# Patient Record
Sex: FEMALE | Race: WHITE | URBAN - METROPOLITAN AREA
[De-identification: names, ages, dates, MRNs, and addresses within clinical notes are randomized per-mention and may not be internally consistent; named-entity substitution may affect disease eponyms.]

---

## 2017-01-23 ENCOUNTER — HOSPITAL ENCOUNTER (OUTPATIENT)
Dept: LAB | Facility: CLINIC | Age: 32
Discharge: HOME | End: 2017-01-23
Attending: PHYSICAL MEDICINE & REHABILITATION | Admitting: PHYSICAL MEDICINE & REHABILITATION
Payer: COMMERCIAL

## 2017-01-23 ENCOUNTER — OFFICE VISIT (OUTPATIENT)
Dept: ORTHOPEDICS | Facility: CLINIC | Age: 32
End: 2017-01-23
Payer: COMMERCIAL

## 2017-01-23 ENCOUNTER — RESULTS ONLY (OUTPATIENT)
Dept: OTHER | Facility: CLINIC | Age: 32
End: 2017-01-23

## 2017-01-23 VITALS
DIASTOLIC BLOOD PRESSURE: 68 MMHG | BODY MASS INDEX: 32.51 KG/M2 | SYSTOLIC BLOOD PRESSURE: 110 MMHG | WEIGHT: 240 LBS | HEIGHT: 72 IN

## 2017-01-23 DIAGNOSIS — M46.1 SACROILIITIS (H): ICD-10-CM

## 2017-01-23 DIAGNOSIS — M25.551 POSTERIOR PAIN OF RIGHT HIP: ICD-10-CM

## 2017-01-23 DIAGNOSIS — M46.1 SACROILIITIS (H): Primary | ICD-10-CM

## 2017-01-23 LAB
CRP SERPL-MCNC: 12.6 MG/L (ref 0–8)
ERYTHROCYTE [SEDIMENTATION RATE] IN BLOOD BY WESTERGREN METHOD: 29 MM/H (ref 0–20)

## 2017-01-23 PROCEDURE — 85652 RBC SED RATE AUTOMATED: CPT | Performed by: PHYSICAL MEDICINE & REHABILITATION

## 2017-01-23 PROCEDURE — 86038 ANTINUCLEAR ANTIBODIES: CPT | Performed by: PHYSICAL MEDICINE & REHABILITATION

## 2017-01-23 PROCEDURE — 36415 COLL VENOUS BLD VENIPUNCTURE: CPT | Performed by: PHYSICAL MEDICINE & REHABILITATION

## 2017-01-23 PROCEDURE — 81374 HLA I TYPING 1 ANTIGEN LR: CPT | Performed by: PHYSICAL MEDICINE & REHABILITATION

## 2017-01-23 PROCEDURE — 86140 C-REACTIVE PROTEIN: CPT | Performed by: PHYSICAL MEDICINE & REHABILITATION

## 2017-01-23 PROCEDURE — 99204 OFFICE O/P NEW MOD 45 MIN: CPT | Performed by: PHYSICAL MEDICINE & REHABILITATION

## 2017-01-23 PROCEDURE — 86431 RHEUMATOID FACTOR QUANT: CPT | Performed by: PHYSICAL MEDICINE & REHABILITATION

## 2017-01-23 NOTE — MR AVS SNAPSHOT
After Visit Summary   1/23/2017    Merlene Ford    MRN: [id number]           Patient Information     Date Of Birth          1985        Visit Information        Provider Department      1/23/2017 8:40 AM Rico Abrams DO [facility]        Today's Diagnoses     Sacroiliitis (H)    -  1       Care Instructions    We addressed the following today:    1. Right hip/low back pain  2. Right sacroilitis    Activity modification as discussed    Home exercise program as instructed    Topical Treatments: Ice/heat    Over the counter medication: Acetaminophen (Tylenol) 1000 mg every 6 hours with food (Maximum of 3000 mg/day)    Referral to Cornelius Kerns DO - [facility] -  for consideration of a right SI joint platelet-rich plasma injection with ultrasound guidance: Call [phone] to schedule    Rheumatological laboratory testing for further evaluation of current medical state    Call OB/GYN for further discussion of oral pain medication as discussed.    Call ~1-2 business days after completion of laboratory testing for discussion of results/further medical care.    Follow-up as needed for further evaluation/medical care (call direct clinic number [[phone]] at any time with questions or concerns)          Follow-ups after your visit        Who to contact     If you have questions or need follow up information about today's clinic visit or your schedule please contact [facility] directly at [phone].  Normal or non-critical lab and imaging results will be communicated to you by MyChart, letter or phone within 4 business days after the clinic has received the results. If you do not hear from us within 7 days, please contact the clinic through MyChart or phone. If you have a critical or abnormal lab result, we will notify you by phone as soon as possible.  Submit refill requests through [employer] or call your pharmacy and they will forward the refill  "request to us. Please allow 3 business days for your refill to be completed.          Additional Information About Your Visit        [employer] Information     Myandb lets you send messages to your doctor, view your test results, renew your prescriptions, schedule appointments and more. To sign up, go to [email or web address] . Click on \"Log in\" on the left side of the screen, which will take you to the Welcome page. Then click on \"Sign up Now\" on the right side of the page.     You will be asked to enter the access code listed below, as well as some personal information. Please follow the directions to create your username and password.     Your access code is: [email or web address]  Expires: 2017  9:29 AM     Your access code will  in 90 days. If you need help or a new code, please call your [facility] clinic or [phone].        Care EveryWhere ID     This is your Care EveryWhere ID. This could be used by other organizations to access your [facility] medical records  [id number]        Your Vitals Were     Height BMI (Body Mass Index)                1.829 m (6') 32.54 kg/m2           Blood Pressure from Last 3 Encounters:   17 110/68    Weight from Last 3 Encounters:   17 108.863 kg (240 lb)              Today, you had the following     No orders found for display       Primary Care Provider    Physician No Ref-Primary       No address on file        Thank you!     Thank you for choosing [facility]  for your care. Our goal is always to provide you with excellent care. Hearing back from our patients is one way we can continue to improve our services. Please take a few minutes to complete the written survey that you may receive in the mail after your visit with us. Thank you!             Your Updated Medication List - Protect others around you: Learn how to safely use, store and throw away your medicines at www.disposemymeds.org.          This list is accurate as of: 17  9:29 " AM.  Always use your most recent med list.                   Brand Name Dispense Instructions for use    PRENATAL VITAMIN PO          TYLENOL PM EXTRA STRENGTH PO          TYLENOL PO

## 2017-01-23 NOTE — PROGRESS NOTES
[facility]   Clinic Visit s Jan 23, 2017    Subjective:  Merlene Ford is a 31 year old female who is 29 weeks pregnant who is seen as self referral for evaluation of right low back/posterior hip pain with radiation. Patient is accompanied in clinic today by her spouse.    Symptoms began 9-10 months ago.  Reports insidious onset without acute precipitating event.  Reports gradual onset of sharp right hip pain that is located posterior with radiation to the right posterior thigh stopping at the right knee.  Pain is 10/10 in maximal severity and 1/10 currently.  Symptoms are generally worse with laying down and at night and better with rest.  Other treatment has consisted of formal physical therapy ([facility]), SI joint belt, chiropractic care, right hip corticosteroid injections (x 2), and right sacroiliac joint corticosteroid injection with ultrasound guidance with minimal relief.  Denies any bowel/bladder dysfunction or saddle anesthesia.  Denies any numbness/tingling/weakness of the right lower extremity.  Denies any previous low back/right hip injuries/surgeries.     Patient's past medical, surgical, social, and family histories are reviewed today.  Significant medical history as noted above   No past medical history on file.    Review of Systems:  Constitutional: NEGATIVE for fever, chills, or change in weight  Skin: NEGATIVE for worrisome rashes, moles, or lesions  Neuro: NEGATIVE for weakness of the right lower extremity  MSK: see HPI  Additional 10 point ROS is negative other than symptoms noted above and in HPI    Objective:  /68 mmHg  Ht 1.829 m (6')  Wt 108.863 kg (240 lb)  BMI 32.54 kg/m2  General: healthy, alert, and in mild distress  Skin: no suspicious lesions or rashes  Psych: mentation appears normal and affect normal/bright  HEENT: no scleral icterus  CV: no pedal edema  Resp: normal respiratory effort without conversational dyspnea   Neuro:  decreased sensation to light touch of the right lateral foot region.  Motor strength as noted below  Lymph: no palpable lymphadenopathy    MSK:    THORACIC/LUMBAR SPINE  Inspection:    No redness, swelling, overlying skin change, or gross deformity/asymmetry  Palpation:    Tender about the right SI joint    No tenderness over the lumbar spinous processes, lumbar facet joints, paralumbar musculature, SI joint (left), or sciatic notches (bilateral)  Range of Motion:     Lumbar flexion: no pain    Lumbar extension: with pain  Strength:    Quadriceps: 5/5    Hamstrings: 5/5    Dorsiflexion: 5/5    Plantarflexion: 5/5    Great toe extension: 5/5   Special Tests:    Positive: Straight leg raise (right - posterior hip) and Gaenslen's test (right)    Negative: CATRACHO (right)    Imaging:  No x-rays indicated during today's visit  Outside images from [facility] were reviewed today, independent visualization of images was performed, and results were discussed with the patient/spouse  MRI of the Right Hip without Contrast - 10/31/2016  Impression:  1. Mild-moderate bilateral sacroiliitis versus degenerative change.  2. No evidence of trochanteric bursitis.    ASSESSMENT:  1. Right sacroiliitis  2. Right posterior hip pain    PLAN:  1. Acetaminophen/ice/heat as needed for improved pain control.  Instructed to call OB/GYN provider to discuss additional oral medications that could be used including consideration of Flexeril for improvement of pain/discomfort.  2. Activity modification as discussed, including limitation of activities that cause pain/discomfort.  3. Continue with pain-free home exercise program as previously prescribed from formal physical therapy.  4. Rheumatological laboratory testing ordered including HLA-B27, RF, ESR, CRP, AGUSTIN panel, etc. for further evaluation of current medical state.  5. Referral to Dr. Cornelius Kerns at [facility] for consideration of a right sacroiliac joint platelet rich plasma  injection with ultrasound guidance for improvement of pain/discomfort.  6. Call ~1-2 business days after completion of laboratory testing for discussion of results/further medical care.  7. Follow-up as needed for further evaluation/medical care.  Instructed to follow-up if change of symptoms arise.  Consider referral to return to formal physical therapy, referral to orthopedic surgery, etc for further treatment purposes moving forward.  Instructed to contact our office should the condition evolve or worsen, or should any new/progressive neurologic symptoms develop.    Patient's conditions were thoroughly discussed during today's visit with greater than 50% of the visit spent counseling the patient/spouse with total time spent face-to-face with the patient/spouse being 20 minutes.    Rico Abrams DO, [facility]  [facility]    Disclaimer: This note consists of symbols derived from keyboarding, dictation and/or voice recognition software. As a result, there may be errors in the script that have gone undetected. Please consider this when interpreting information found in this chart.    This document serves as a record of the services and decisions personally performed and made by Rico Abrams DO. It was created on his behalf by Juan M Bangura, a trained medical scribe. The creation of this document is based on the provider's statements to the medical scribe.  Juan M Bangura January 23, 2017 9:04 AM

## 2017-01-23 NOTE — PATIENT INSTRUCTIONS
We addressed the following today:    1. Right hip/low back pain  2. Right sacroiliitis    Activity modification as discussed    Home exercise program as instructed    Topical Treatments: Ice/heat    Over the counter medication: Acetaminophen (Tylenol) 1000 mg every 6 hours with food (Maximum of 3000 mg/day)    Referral to Cornelius Kerns DO - [facility] -  for consideration of a right sacroiliac joint platelet-rich plasma injection with ultrasound guidance: Call [phone] to schedule    Rheumatological laboratory testing ordered for further evaluation of current medical state    Call OB/GYN for further discussion of oral pain medications as discussed for improvement of pain/discomfort    Call ~1-2 business days after completion of laboratory testing for discussion of results/further medical care    Follow-up as needed for further evaluation/medical care (call direct clinic number [[phone]] at any time with questions or concerns)

## 2017-01-24 ENCOUNTER — TELEPHONE (OUTPATIENT)
Dept: ORTHOPEDICS | Facility: CLINIC | Age: 32
End: 2017-01-24

## 2017-01-24 LAB
ANA SER QL IA: NORMAL
HLA-B27 QL NAA+PROBE: NORMAL
RHEUMATOID FACT SER NEPH-ACNC: <20 IU/ML (ref 0–20)

## 2017-01-24 NOTE — TELEPHONE ENCOUNTER
Pt left VM asking Dr's note and referral be faxed to [facility] as they will not schedule patient prior to receiving information, can be faxed to [phone], Attn: [facility].    Notes were faxed as requested as Ortho  did not assist patient with scheduling as this is an external referral.

## 2017-01-26 ENCOUNTER — TELEPHONE (OUTPATIENT)
Dept: ORTHOPEDICS | Facility: CLINIC | Age: 32
End: 2017-01-26

## 2017-01-26 DIAGNOSIS — M46.1 SACROILIITIS (H): Primary | ICD-10-CM

## 2017-01-26 LAB
B LOCUS: NORMAL
B27TEST METHOD: NORMAL

## 2017-01-26 NOTE — TELEPHONE ENCOUNTER
Please call patient to inform that results of laboratory testing showed an elevated ESR and CRP levels which is indicative of inflammation. The remainder of her laboratory testing was noted to be unremarkable. Please inform patient that in addition to referral previously placed would recommend referral to rheumatology for further evaluation/medical care. Can be seen in our location by rheumatology or can be referred to [facility] or to the [facility] (recommended) for further evaluation/medical care.    Rico Abrams DO, CAQSM  [facility]

## 2017-01-27 NOTE — TELEPHONE ENCOUNTER
Phone call to patient. She would like a referral to Rheumatology that can get her in the soonest. Informed most likely would be our office building location. Will get back with her next week once referral is placed.     Please see pending referral and complete and sign if appropriate.     COSME Gee RN

## 2017-01-30 NOTE — TELEPHONE ENCOUNTER
Referral signed.  Please call patient to inform.    Rico Abrams DO, MONTSEM  [facility]

## 2017-01-30 NOTE — TELEPHONE ENCOUNTER
Left message for patient that referral was placed. Gave contact number to schedule if she does not get a call within 48 hrs. Asked that she call back for any further questions.     COSME eGe RN

## 2017-01-31 ENCOUNTER — OFFICE VISIT (OUTPATIENT)
Dept: RHEUMATOLOGY | Facility: CLINIC | Age: 32
End: 2017-01-31
Payer: COMMERCIAL

## 2017-01-31 VITALS
WEIGHT: 243 LBS | HEART RATE: 102 BPM | SYSTOLIC BLOOD PRESSURE: 113 MMHG | TEMPERATURE: 97 F | DIASTOLIC BLOOD PRESSURE: 77 MMHG | BODY MASS INDEX: 32.95 KG/M2

## 2017-01-31 DIAGNOSIS — M45.9 ANKYLOSING SPONDYLITIS (H): Primary | ICD-10-CM

## 2017-01-31 PROCEDURE — 99203 OFFICE O/P NEW LOW 30 MIN: CPT | Performed by: INTERNAL MEDICINE

## 2017-01-31 RX ORDER — PREDNISONE 10 MG/1
TABLET ORAL
Qty: 70 TABLET | Refills: 1 | Status: SHIPPED | OUTPATIENT
Start: 2017-01-31 | End: 2017-02-24

## 2017-01-31 NOTE — PROGRESS NOTES
Chief Complaint   Patient presents with     Consult     Dr. Abrams ref.    Pt. Is 30 weeks pregnant     States blood tests were abnormal. Pain in back and hip.   MRI [facility] ortho. Inflammation showing    Initial /77 mmHg  Pulse 102  Temp(Src) 97  F (36.1  C)  Wt 110.224 kg (243 lb) Estimated body mass index is 32.95 kg/(m^2) as calculated from the following:    Height as of 1/23/17: 1.829 m (6').    Weight as of this encounter: 110.224 kg (243 lb).      Patient prefers to be contacted via at Home.   Okay to leave detailed message: Yes  Patient uses MyChart: Yes    Angelica SAINZ LPN    22

## 2017-01-31 NOTE — Clinical Note
January 31, 2017      Merlene Ford  [address]  [facility] [address]              Return to  Work Release    Date: 1/31/2017      Name: Merlene Ford                       YOB: 1985        The patient was seen at: [facility]. [facility]      Resume Activity: In 7 days.        _________________________  Almsa Ro MD

## 2017-01-31 NOTE — PROGRESS NOTES
CHIEF COMPLAINT:  Hip pain.      HISTORY:  Merlene Ford is a 31-year-old female who has had a 9-month history of what appears to be right hip pain with further inquiry though she says she admits to having morning stiffness in her back for many years, probably since her late teens or early 20s that she basically just ignored and was normal apart of systems.  At this point she is 29 weeks' pregnant but started having increasing pain that keeps her awake at night in the right hip.  She was seen and evaluated by Orthopedics who treated her with a variety of modalities and more recently by medical Orthopedics.  She apparently at one point was given a Depo-Medrol Dosepak with no real relief, recently given an SI injection also with no real relief.  An MRI of the hip did not reveal any abnormalities in the hips but did reveal mild to moderate bilateral sacroiliitis versus degenerative change with reactive subchondral edema.        The patient does not have any other joint pain and does admit that she is having pain in her right SI region and down into the groin and right leg.      The patient does not have a rash or psoriasis.  She does not have acute or chronic diarrhea or history of inflammatory bowel disease.  She has never had scleritis or iritis.  There is no history of other joint pain, swelling, or stiffness.  She is stiff in the morning for well over an hour.      PAST MEDICAL HISTORY:  Pregnancy.        MEDICATIONS:  Iron sulfate, Tylenol.      DRUG ALLERGIES:  None.      SOCIAL HISTORY:  Not a smoker or drinker.      FAMILY HISTORY:  There is nobody in her family, psoriasis, ankylosing spondylitis, Crohn's disease.      REVIEW OF SYSTEMS:  As noted above.      PHYSICAL EXAMINATION:   VITAL SIGNS:  Blood pressure of recurrent, 113/77, pulse 102, weight 243.   HEENT:  She is normocephalic and atraumatic.  Sclerae are clear and moist.  Oropharynx without lesions.   NECK:  Supple without lymphadenopathy.   LUNGS:   Clear.   HEART:  Regular without murmur.   ABDOMEN:  Nontender with normal bowel sounds.   MUSCULOSKELETAL:  Joint exam there is no synovitis, erythema or tenderness of the wrists, MCPs, or PIPs, nor of the elbows, shoulders, knees, ankles, MTP joints.  Range of motion of the back is somewhat limited, at the lower lumbar spine there is tenderness over the right SI joint, there is pain with external and internal rotation of the hip.   SKIN:  Without lesions.      IMPRESSION:  Ankylosing spondylitis.      RECOMMENDATIONS:   1.  Discussed etiology, pathogenesis, treatment options of ankylosing spondylitis and educational material provided.   2.  I will need to get an MRI reviewed with Radiology to make certain that we are not seeing something else, but does sound like with reactive edema this is an inflammatory sacroiliitis best characterized as ankylosing spondylitis.     3.  Recommend a longer course of prednisone 40 mg daily for a week and then taper by 10 mg weekly.  It is difficult to get her dose to 10 mg or less until she delivers.  She can also discuss this with Gynecology.   4.  For the long-term I would recommend treatment with Humira.  Side effects of Humira were briefly discussed, educational material provided.   5.  Let me know if she is not improved by the weekend and can make a followup with me in approximately 3-4 weeks.         BOBBY DE SOUZA MD             D: 2017 12:42   T: 2017 12:57   MT: [id number]      Name:     CAMPBELL CISNEROS   MRN:      [id number]        Account:      [id number]   :      1985           Visit Date:   2017      Document: [id number]       cc: Rico Can MD

## 2017-01-31 NOTE — PATIENT INSTRUCTIONS
Ankylosing Spondylitis in Adults  Arthritis is a disease that affects joints in your body. Ankylosing spondylitis (AS) is a type of arthritis that attacks the spine. The name comes from the Anguillan language.  Ankylosing  means stiffening of the joints.  Spondyl  refers to the spine, or vertebrae.  What causes Ankylosing spondylitis?  Healthcare providers don t know exactly what causes AS. Genes may play a role. That s because almost all cases of AS happen in people with a gene called HLA-B27. But only a small percentage of people with this gene actually get AS.  Young and old people can develop AS. But it s more common in people ages 17 to 35. Men are more likely than women to have AS. You are also more likely to have it if someone else in your family had it.  Symptoms  Like other types of arthritis, AS causes pain and stiffness. The spine and other nearby joints, such as the hip, become inflamed. In serious cases, the disease may break down the joints. Bones may even fuse together.  Symptoms of AS may come and go. They often include:    Back pain, especially in the morning when waking up from sleep    Body aches, such as in the legs, shoulders, buttocks, or heels    Stiffness in the morning    Stooped posture to ease pain    Problems inhaling deeply if AS affects the joints between the ribs and the spine    Lack of appetite    Fatigue    Fever    Anemia  Some people with AS also have skin rashes and stomach illnesses. They may have eye problems, too. These include pain, redness, and sensitivity to light. Severe cases of the disease may damage organs such as the heart and lungs. Osteoporosis happens in about 50% of AS patients.   Diagnosing Ankylosing spondylitis  To diagnose AS, your healthcare provider will start with a physical exam. He or she will ask about your symptoms and medical history. X-rays of your spine and other joints may show joint damage. MRI testing (magnetic resonance imaging) may be done as  well. Genetic testing can find out if you have the HLA-B27 gene.  Your healthcare provider may also recommend a lab test that checks for inflammation.  An erythrocyte sedimentation rate test measures how quickly red blood cells fall to the bottom of a test tube. If you have inflammation from arthritis, the red blood cells will clump together and fall faster.  Treating Ankylosing spondylitis  AS can t be cured. But treatments can ease pain and stiffness. They can help you live a more active life. Your healthcare provider will choose the best treatment based on your overall health, the severity of your disease, and other factors.  Several types of medicine can reduce pain and inflammation. These medicines include:    Nonsteroidal anti-inflammatory drugs (NSAIDs), such as naproxen or ibuprofen    Corticosteroids, which can be injected into affected joints and areas.     Muscle relaxants    Biologic medicines    Disease-modifying antirheumatic drugs (DMARDs)  Alternative treatments may also be helpful. Speak with your healthcare provider about the potential benefits of acupuncture, massage, yoga, and TENS units (transcutaneous electrical nerve stimulation).  Quitting smoking may help.   Your healthcare provider may also recommend surgery. For instance, you may need to have a joint replaced. Other procedures remove damaged bone or insert rods into the spine.   Exercising regularly can help relieve your symptoms. Be sure to include activities that strengthen the back and increase flexibility and range of motion. Your healthcare provider may also suggest physical therapy. Maintaining proper posture is important, too.    0751-0148 [employer]. [address] [place] [address], [place] [address]. All rights reserved. This information is not intended as a substitute for professional medical care. Always follow your healthcare professional's instructions.        Adalimumab Solution for injection  What is this  medicine?  ADALIMUMAB (a luis daniel AYE mu mab) is used to treat several types of arthritis. It is also used to treat Crohn's disease, ulcerative colitis, plaque psoriasis, and hidradenitis suppurativa.  This medicine may be used for other purposes; ask your health care provider or pharmacist if you have questions.  What should I tell my health care provider before I take this medicine?  They need to know if you have any of these conditions:    diabetes    heart disease    hepatitis B or history of hepatitis B infection    immune system problems    infection or history of infections    multiple sclerosis    recently received or scheduled to receive a vaccine    scheduled to have surgery    tuberculosis, a positive skin test for tuberculosis or have recently been in close contact with someone who has tuberculosis    an unusual reaction to adalimumab, other medicines, mannitol, latex, rubber, foods, dyes, or preservatives    pregnant or trying to get pregnant    breast-feeding  How should I use this medicine?  This medicine is for injection under the skin. You will be taught how to prepare and give this medicine. Use exactly as directed. Take your medicine at regular intervals. Do not take your medicine more often than directed.  A special MedGuide will be given to you by the pharmacist with each prescription and refill. Be sure to read this information carefully each time.  It is important that you put your used needles and syringes in a special sharps container. Do not put them in a trash can. If you do not have a sharps container, call your pharmacist or healthcare provider to get one.  Talk to your pediatrician regarding the use of this medicine in children. While this drug may be prescribed for children as young as 2 years for selected conditions, precautions do apply.  The  of the medicine offers free information to patients and their health care partners. Call [phone] for more  information.  Overdosage: If you think you have taken too much of this medicine contact a poison control center or emergency room at once.  NOTE: This medicine is only for you. Do not share this medicine with others.  What if I miss a dose?  If you miss a dose, take it as soon as you can. If it is almost time for your next dose, take only that dose. Do not take double or extra doses. Give the next dose when your next scheduled dose is due. Call your doctor or health care professional if you are not sure how to handle a missed dose.  What may interact with this medicine?  Do not take this medicine with any of the following medications:    abatacept    anakinra    etanercept    infliximab    live virus vaccines    rilonacept  This medicine may also interact with the following medications:    vaccines  This list may not describe all possible interactions. Give your health care provider a list of all the medicines, herbs, non-prescription drugs, or dietary supplements you use. Also tell them if you smoke, drink alcohol, or use illegal drugs. Some items may interact with your medicine.  What should I watch for while using this medicine?  Visit your doctor or health care professional for regular checks on your progress. Tell your doctor or healthcare professional if your symptoms do not start to get better or if they get worse.  You will be tested for tuberculosis (TB) before you start this medicine. If your doctor prescribes any medicine for TB, you should start taking the TB medicine before starting this medicine. Make sure to finish the full course of TB medicine.  Call your doctor or health care professional if you get a cold or other infection while receiving this medicine. Do not treat yourself. This medicine may decrease your body's ability to fight infection.  Talk to your doctor about your risk of cancer. You may be more at risk for certain types of cancers if you take this medicine.  What side effects may I  notice from receiving this medicine?  Side effects that you should report to your doctor or health care professional as soon as possible:    allergic reactions like skin rash, itching or hives, swelling of the face, lips, or tongue    breathing problems    changes in vision    chest pain    fever, chills, or any other sign of infection    numbness or tingling    red, scaly patches or raised bumps on the skin    swelling of the ankles    swollen lymph nodes in the neck, underarm, or groin areas    unexplained weight loss    unusual bleeding or bruising    unusually weak or tired  Side effects that usually do not require medical attention (report to your doctor or health care professional if they continue or are bothersome):    headache    nausea    redness, itching, swelling, or bruising at site where injected  This list may not describe all possible side effects. Call your doctor for medical advice about side effects. You may report side effects to FDA at 6-406-FDA-5363.  Where should I keep my medicine?  Keep out of the reach of children.  Store in the original container and in the refrigerator between 2 and 8 degrees C (36 and 46 degrees F). Do not freeze. The product may be stored in a cool carrier with an ice pack, if needed. Protect from light. Throw away any unused medicine after the expiration date.  NOTE:This sheet is a summary. It may not cover all possible information. If you have questions about this medicine, talk to your doctor, pharmacist, or health care provider. Copyright  2016 [employer]

## 2017-01-31 NOTE — MR AVS SNAPSHOT
After Visit Summary   1/31/2017    Merlene Ford    MRN: [id number]           Patient Information     Date Of Birth          1985        Visit Information        Provider Department      1/31/2017 9:30 AM Almas Ro MD [facility]        Today's Diagnoses     Ankylosing spondylitis (H)    -  1       Care Instructions      Ankylosing Spondylitis in Adults  Arthritis is a disease that affects joints in your body. Ankylosing spondylitis (AS) is a type of arthritis that attacks the spine. The name comes from the [place] language.  Ankylosing  means stiffening of the joints.  Spondyl  refers to the spine, or vertebrae.  What causes Ankylosing spondylitis?  Healthcare providers don t know exactly what causes AS. Genes may play a role. That s because almost all cases of AS happen in people with a gene called HLA-B27. But only a small percentage of people with this gene actually get AS.  Young and old people can develop AS. But it s more common in people ages 17 to 35. Men are more likely than women to have AS. You are also more likely to have it if someone else in your family had it.  Symptoms  Like other types of arthritis, AS causes pain and stiffness. The spine and other nearby joints, such as the hip, become inflamed. In serious cases, the disease may break down the joints. Bones may even fuse together.  Symptoms of AS may come and go. They often include:    Back pain, especially in the morning when waking up from sleep    Body aches, such as in the legs, shoulders, buttocks, or heels    Stiffness in the morning    Stooped posture to ease pain    Problems inhaling deeply if AS affects the joints between the ribs and the spine    Lack of appetite    Fatigue    Fever    Anemia  Some people with AS also have skin rashes and stomach illnesses. They may have eye problems, too. These include pain, redness, and sensitivity to light. Severe cases of the disease may damage  organs such as the heart and lungs. Osteoporosis happens in about 50% of AS patients.   Diagnosing Ankylosing spondylitis  To diagnose AS, your healthcare provider will start with a physical exam. He or she will ask about your symptoms and medical history. X-rays of your spine and other joints may show joint damage. MRI testing (magnetic resonance imaging) may be done as well. Genetic testing can find out if you have the HLA-B27 gene.  Your healthcare provider may also recommend a lab test that checks for inflammation.  An erythrocyte sedimentation rate test measures how quickly red blood cells fall to the bottom of a test tube. If you have inflammation from arthritis, the red blood cells will clump together and fall faster.  Treating Ankylosing spondylitis  AS can t be cured. But treatments can ease pain and stiffness. They can help you live a more active life. Your healthcare provider will choose the best treatment based on your overall health, the severity of your disease, and other factors.  Several types of medicine can reduce pain and inflammation. These medicines include:    Nonsteroidal anti-inflammatory drugs (NSAIDs), such as naproxen or ibuprofen    Corticosteroids, which can be injected into affected joints and areas.     Muscle relaxants    Biologic medicines    Disease-modifying antirheumatic drugs (DMARDs)  Alternative treatments may also be helpful. Speak with your healthcare provider about the potential benefits of acupuncture, massage, yoga, and TENS units (transcutaneous electrical nerve stimulation).  Quitting smoking may help.   Your healthcare provider may also recommend surgery. For instance, you may need to have a joint replaced. Other procedures remove damaged bone or insert rods into the spine.   Exercising regularly can help relieve your symptoms. Be sure to include activities that strengthen the back and increase flexibility and range of motion. Your healthcare provider may also suggest  physical therapy. Maintaining proper posture is important, too.    7995-0870 [employer]. [address] [place] [address], [place] [address]. All rights reserved. This information is not intended as a substitute for professional medical care. Always follow your healthcare professional's instructions.        Adalimumab Solution for injection  What is this medicine?  ADALIMUMAB (a luis daniel AYE mu mab) is used to treat several types of arthritis. It is also used to treat Crohn's disease, ulcerative colitis, plaque psoriasis, and hidradenitis suppurativa.  This medicine may be used for other purposes; ask your health care provider or pharmacist if you have questions.  What should I tell my health care provider before I take this medicine?  They need to know if you have any of these conditions:    diabetes    heart disease    hepatitis B or history of hepatitis B infection    immune system problems    infection or history of infections    multiple sclerosis    recently received or scheduled to receive a vaccine    scheduled to have surgery    tuberculosis, a positive skin test for tuberculosis or have recently been in close contact with someone who has tuberculosis    an unusual reaction to adalimumab, other medicines, mannitol, latex, rubber, foods, dyes, or preservatives    pregnant or trying to get pregnant    breast-feeding  How should I use this medicine?  This medicine is for injection under the skin. You will be taught how to prepare and give this medicine. Use exactly as directed. Take your medicine at regular intervals. Do not take your medicine more often than directed.  A special MedGuide will be given to you by the pharmacist with each prescription and refill. Be sure to read this information carefully each time.  It is important that you put your used needles and syringes in a special sharps container. Do not put them in a trash can. If you do not have a sharps container, call your pharmacist or healthcare  provider to get one.  Talk to your pediatrician regarding the use of this medicine in children. While this drug may be prescribed for children as young as 2 years for selected conditions, precautions do apply.  The  of the medicine offers free information to patients and their health care partners. Call [phone] for more information.  Overdosage: If you think you have taken too much of this medicine contact a poison control center or emergency room at once.  NOTE: This medicine is only for you. Do not share this medicine with others.  What if I miss a dose?  If you miss a dose, take it as soon as you can. If it is almost time for your next dose, take only that dose. Do not take double or extra doses. Give the next dose when your next scheduled dose is due. Call your doctor or health care professional if you are not sure how to handle a missed dose.  What may interact with this medicine?  Do not take this medicine with any of the following medications:    abatacept    anakinra    etanercept    infliximab    live virus vaccines    rilonacept  This medicine may also interact with the following medications:    vaccines  This list may not describe all possible interactions. Give your health care provider a list of all the medicines, herbs, non-prescription drugs, or dietary supplements you use. Also tell them if you smoke, drink alcohol, or use illegal drugs. Some items may interact with your medicine.  What should I watch for while using this medicine?  Visit your doctor or health care professional for regular checks on your progress. Tell your doctor or healthcare professional if your symptoms do not start to get better or if they get worse.  You will be tested for tuberculosis (TB) before you start this medicine. If your doctor prescribes any medicine for TB, you should start taking the TB medicine before starting this medicine. Make sure to finish the full course of TB medicine.  Call your doctor  or health care professional if you get a cold or other infection while receiving this medicine. Do not treat yourself. This medicine may decrease your body's ability to fight infection.  Talk to your doctor about your risk of cancer. You may be more at risk for certain types of cancers if you take this medicine.  What side effects may I notice from receiving this medicine?  Side effects that you should report to your doctor or health care professional as soon as possible:    allergic reactions like skin rash, itching or hives, swelling of the face, lips, or tongue    breathing problems    changes in vision    chest pain    fever, chills, or any other sign of infection    numbness or tingling    red, scaly patches or raised bumps on the skin    swelling of the ankles    swollen lymph nodes in the neck, underarm, or groin areas    unexplained weight loss    unusual bleeding or bruising    unusually weak or tired  Side effects that usually do not require medical attention (report to your doctor or health care professional if they continue or are bothersome):    headache    nausea    redness, itching, swelling, or bruising at site where injected  This list may not describe all possible side effects. Call your doctor for medical advice about side effects. You may report side effects to FDA at 7-408-FDA-8835.  Where should I keep my medicine?  Keep out of the reach of children.  Store in the original container and in the refrigerator between 2 and 8 degrees C (36 and 46 degrees F). Do not freeze. The product may be stored in a cool carrier with an ice pack, if needed. Protect from light. Throw away any unused medicine after the expiration date.  NOTE:This sheet is a summary. It may not cover all possible information. If you have questions about this medicine, talk to your doctor, pharmacist, or health care provider. Copyright  2016 [employer]              Follow-ups after your visit        Who to contact     If you  have questions or need follow up information about today's clinic visit or your schedule please contact [facility] directly at [phone].  Normal or non-critical lab and imaging results will be communicated to you by MyChart, letter or phone within 4 business days after the clinic has received the results. If you do not hear from us within 7 days, please contact the clinic through [employer] or phone. If you have a critical or abnormal lab result, we will notify you by phone as soon as possible.  Submit refill requests through [employer] or call your pharmacy and they will forward the refill request to us. Please allow 3 business days for your refill to be completed.          Additional Information About Your Visit        [employer] Information     [employer] gives you secure access to your electronic health record. If you see a primary care provider, you can also send messages to your care team and make appointments. If you have questions, please call your primary care clinic.  If you do not have a primary care provider, please call [phone] and they will assist you.        Care EveryWhere ID     This is your Care EveryWhere ID. This could be used by other organizations to access your [facility] medical records  [id number]        Your Vitals Were     Pulse Temperature                102 97  F (36.1  C)           Blood Pressure from Last 3 Encounters:   01/31/17 113/77   01/23/17 110/68    Weight from Last 3 Encounters:   01/31/17 110.224 kg (243 lb)   01/23/17 108.863 kg (240 lb)              Today, you had the following     No orders found for display         Today's Medication Changes          These changes are accurate as of: 1/31/17 10:18 AM.  If you have any questions, ask your nurse or doctor.               Start taking these medicines.        Dose/Directions    predniSONE 10 MG tablet   Commonly known as:  DELTASONE   Used for:  Ankylosing spondylitis (H)   Started by:  Almas Ro  MD Frandy        40 mg for 1 week and taper by 10 mg weekly   Quantity:  70 tablet   Refills:  1            Where to get your medicines      These medications were sent to [facility]/pharmacy #[id number] - [place], [place] - [address]  [address] [facility] [address]     Phone:  [phone]    - predniSONE 10 MG tablet             Primary Care Provider    Physician No Ref-Primary       No address on file        Thank you!     Thank you for choosing [facility]  for your care. Our goal is always to provide you with excellent care. Hearing back from our patients is one way we can continue to improve our services. Please take a few minutes to complete the written survey that you may receive in the mail after your visit with us. Thank you!             Your Updated Medication List - Protect others around you: Learn how to safely use, store and throw away your medicines at www.disposemymeds.org.          This list is accurate as of: 1/31/17 10:18 AM.  Always use your most recent med list.                   Brand Name Dispense Instructions for use    IRON SUPPLEMENT PO      Take 325 mg by mouth       predniSONE 10 MG tablet    DELTASONE    70 tablet    40 mg for 1 week and taper by 10 mg weekly       PRENATAL VITAMIN PO          TYLENOL PM EXTRA STRENGTH PO          TYLENOL PO

## 2017-02-21 ENCOUNTER — OFFICE VISIT (OUTPATIENT)
Dept: RHEUMATOLOGY | Facility: CLINIC | Age: 32
End: 2017-02-21
Payer: COMMERCIAL

## 2017-02-21 VITALS
SYSTOLIC BLOOD PRESSURE: 118 MMHG | BODY MASS INDEX: 33.36 KG/M2 | HEART RATE: 107 BPM | DIASTOLIC BLOOD PRESSURE: 71 MMHG | WEIGHT: 246 LBS

## 2017-02-21 DIAGNOSIS — M45.9 ANKYLOSING SPONDYLITIS (H): Primary | ICD-10-CM

## 2017-02-21 PROCEDURE — 99213 OFFICE O/P EST LOW 20 MIN: CPT | Performed by: INTERNAL MEDICINE

## 2017-02-21 NOTE — MR AVS SNAPSHOT
After Visit Summary   2/21/2017    Merlene Ford    MRN: [id number]           Patient Information     Date Of Birth          1985        Visit Information        Provider Department      2/21/2017 8:30 AM Almas Ro MD [facility]         Follow-ups after your visit        Who to contact     If you have questions or need follow up information about today's clinic visit or your schedule please contact [facility] directly at [phone].  Normal or non-critical lab and imaging results will be communicated to you by MyChart, letter or phone within 4 business days after the clinic has received the results. If you do not hear from us within 7 days, please contact the clinic through Pathways Platformhart or phone. If you have a critical or abnormal lab result, we will notify you by phone as soon as possible.  Submit refill requests through VOZ or call your pharmacy and they will forward the refill request to us. Please allow 3 business days for your refill to be completed.          Additional Information About Your Visit        MyChart Information     VOZ gives you secure access to your electronic health record. If you see a primary care provider, you can also send messages to your care team and make appointments. If you have questions, please call your primary care clinic.  If you do not have a primary care provider, please call [phone] and they will assist you.        Care EveryWhere ID     This is your Care EveryWhere ID. This could be used by other organizations to access your [place] medical records  [id number]        Your Vitals Were     Pulse BMI (Body Mass Index)                107 33.36 kg/m2           Blood Pressure from Last 3 Encounters:   02/21/17 118/71   01/31/17 113/77   01/23/17 110/68    Weight from Last 3 Encounters:   02/21/17 111.6 kg (246 lb)   01/31/17 110.2 kg (243 lb)   01/23/17 108.9 kg (240 lb)              Today, you  had the following     No orders found for display       Primary Care Provider    Physician No Ref-Primary       No address on file        Thank you!     Thank you for choosing [facility]  for your care. Our goal is always to provide you with excellent care. Hearing back from our patients is one way we can continue to improve our services. Please take a few minutes to complete the written survey that you may receive in the mail after your visit with us. Thank you!             Your Updated Medication List - Protect others around you: Learn how to safely use, store and throw away your medicines at www.disposemymeds.org.          This list is accurate as of: 2/21/17 10:17 AM.  Always use your most recent med list.                   Brand Name Dispense Instructions for use    IRON SUPPLEMENT PO      Take 325 mg by mouth Reported on 2/21/2017       predniSONE 10 MG tablet    DELTASONE    70 tablet    40 mg for 1 week and taper by 10 mg weekly       PRENATAL VITAMIN PO          TYLENOL PM EXTRA STRENGTH PO      Reported on 2/21/2017       TYLENOL PO      Reported on 2/21/2017

## 2017-02-21 NOTE — PROGRESS NOTES
HISTORY:  Ms. Merlene Cisneros is seen back in followup for her ankylosing spondylitis.  She is doing quite well on the prednisone, has a little bit of morning stiffness, but overall thinks things are going well.  The pregnancy is going well as well.  She is down to 10 mg a day of prednisone and is wondering further about the taper.  At this point, she has elected not to pursue treatment with a TNF inhibitor while she is pregnant.  We discussed that its utilization in the postpartum period and that it is acceptable to use even if breastfeeding, although there may be a slightly increased risk of infections such as mastitis.  There is no chance that it will affect the baby/infant as it does not go across in breastmilk.      PHYSICAL EXAMINATION:  Blood pressure 118/71, pulse 101, weight 246.  The lower back is stiff, but she does appear to be moving fairly well today.  Today peripheral joint exam was unremarkable.      IMPRESSION:  Ankylosing spondylitis.      RECOMMENDATIONS:     1.  I still do need to review her MRI with Radiology tomorrow.    2.  Stay on the 10 mg of prednisone for 1 week and then try to go to 5 mg a day.  Do not try to come off while she is pregnant.   3.  In the postpartum period, come back to see me discuss going on a TNF inhibitor such as Humira.         BOBBY DE SOUZA MD             D: 2017 12:16   T: 2017 12:44   MT: JOSÉ MIGUEL#[id number]      Name:     MERLENE CISNEROS   MRN:      [id number]        Account:      [id number]   :      1985           Visit Date:   2017      Document: [id number]

## 2017-02-21 NOTE — NURSING NOTE
Chief Complaint   Patient presents with     RECHECK     States she is doing well.    Initial /71 (BP Location: Right arm, Cuff Size: Adult Regular)  Pulse 107  Wt 111.6 kg (246 lb)  BMI 33.36 kg/m2 Estimated body mass index is 33.36 kg/(m^2) as calculated from the following:    Height as of 1/23/17: 1.829 m (6').    Weight as of this encounter: 111.6 kg (246 lb).      Patient prefers to be contacted via at Home.   Okay to leave detailed message: Yes  Patient uses MyChart: Yes    Angelica SAINZ LPN

## 2017-02-22 ENCOUNTER — MYC MEDICAL ADVICE (OUTPATIENT)
Dept: RHEUMATOLOGY | Facility: CLINIC | Age: 32
End: 2017-02-22

## 2017-02-24 ENCOUNTER — MYC MEDICAL ADVICE (OUTPATIENT)
Dept: RHEUMATOLOGY | Facility: CLINIC | Age: 32
End: 2017-02-24

## 2017-02-24 DIAGNOSIS — M45.9 ANKYLOSING SPONDYLITIS (H): ICD-10-CM

## 2017-02-24 RX ORDER — PREDNISONE 10 MG/1
10-15 TABLET ORAL DAILY
Qty: 45 TABLET | Refills: 0 | Status: SHIPPED | OUTPATIENT
Start: 2017-02-24 | End: 2017-02-28

## 2017-02-28 RX ORDER — PREDNISONE 10 MG/1
10-15 TABLET ORAL DAILY
Qty: 45 TABLET | Refills: 2 | Status: SHIPPED | OUTPATIENT
Start: 2017-02-28 | End: 2017-03-29

## 2017-03-01 ENCOUNTER — MYC MEDICAL ADVICE (OUTPATIENT)
Dept: RHEUMATOLOGY | Facility: CLINIC | Age: 32
End: 2017-03-01

## 2017-03-02 NOTE — TELEPHONE ENCOUNTER
Try goinig to 40 mg for 1 week and then working down by 10 mg every week to 20 mg and then 5 mg every week as low as possible and if we can't do that then she will need to go to humira

## 2017-03-21 ENCOUNTER — OFFICE VISIT (OUTPATIENT)
Dept: RHEUMATOLOGY | Facility: CLINIC | Age: 32
End: 2017-03-21
Payer: COMMERCIAL

## 2017-03-21 VITALS — DIASTOLIC BLOOD PRESSURE: 73 MMHG | HEART RATE: 80 BPM | SYSTOLIC BLOOD PRESSURE: 116 MMHG

## 2017-03-21 DIAGNOSIS — M45.9 ANKYLOSING SPONDYLITIS (H): Primary | ICD-10-CM

## 2017-03-21 PROCEDURE — 99213 OFFICE O/P EST LOW 20 MIN: CPT | Performed by: INTERNAL MEDICINE

## 2017-03-21 NOTE — MR AVS SNAPSHOT
After Visit Summary   3/21/2017    Merlene Ford    MRN: [id number]           Patient Information     Date Of Birth          1985        Visit Information        Provider Department      3/21/2017 12:45 PM Almas Ro MD [facility]        Today's Diagnoses     Ankylosing spondylitis (H)    -  1       Follow-ups after your visit        Who to contact     If you have questions or need follow up information about today's clinic visit or your schedule please contact [facility] directly at [phone].  Normal or non-critical lab and imaging results will be communicated to you by Conductivhart, letter or phone within 4 business days after the clinic has received the results. If you do not hear from us within 7 days, please contact the clinic through AVISt or phone. If you have a critical or abnormal lab result, we will notify you by phone as soon as possible.  Submit refill requests through The [email or web address] or call your pharmacy and they will forward the refill request to us. Please allow 3 business days for your refill to be completed.          Additional Information About Your Visit        MyChart Information     The [email or web address] gives you secure access to your electronic health record. If you see a primary care provider, you can also send messages to your care team and make appointments. If you have questions, please call your primary care clinic.  If you do not have a primary care provider, please call [phone] and they will assist you.        Care EveryWhere ID     This is your Care EveryWhere ID. This could be used by other organizations to access your [place] medical records  [id number]        Your Vitals Were     Pulse                   80            Blood Pressure from Last 3 Encounters:   03/21/17 116/73   02/21/17 118/71   01/31/17 113/77    Weight from Last 3 Encounters:   02/21/17 246 lb (111.6 kg)   01/31/17 243 lb (110.2 kg)   01/23/17 240 lb  (108.9 kg)              Today, you had the following     No orders found for display       Primary Care Provider    Physician No Ref-Primary       No address on file        Thank you!     Thank you for choosing [facility]  for your care. Our goal is always to provide you with excellent care. Hearing back from our patients is one way we can continue to improve our services. Please take a few minutes to complete the written survey that you may receive in the mail after your visit with us. Thank you!             Your Updated Medication List - Protect others around you: Learn how to safely use, store and throw away your medicines at www.disposemymeds.org.          This list is accurate as of: 3/21/17 11:59 PM.  Always use your most recent med list.                   Brand Name Dispense Instructions for use    IRON SUPPLEMENT PO      Take 325 mg by mouth Reported on 3/21/2017       predniSONE 10 MG tablet    DELTASONE    45 tablet    Take 1-1.5 tablets (10-15 mg) by mouth daily       PRENATAL VITAMIN PO          TYLENOL PM EXTRA STRENGTH PO      Reported on 3/21/2017       TYLENOL PO      Reported on 3/21/2017       ZANTAC PO

## 2017-03-21 NOTE — PROGRESS NOTES
HISTORY:  Ms. Merlene Cisneros comes in because over the weekend she had been in urgent care or the ER; she had swelling in her right leg.  An ultrasound was unremarkable, so they told her check in with me.  She had been having more pain in her right hip again.  Denied having actual pain or swelling in the knee itself.  She is on 40 mg a day of prednisone, scheduled to go to 30.  She says as long as she is not weightbearing, she does not hurt.  Unfortunately, she is still working and plans to work until 04/01/2017.  She is also not quite ready to start Humira.  She also wondered whether or not it is safe to used in breastfeeding; this should not be an issue as it is a large molecule that will not go across in breastmilk.  This could give an increased risk of infection such as mastitis, although I am not aware of any actual data documenting this one way or another.      PHYSICAL EXAMINATION:  Blood pressure 116/73, pulse 80.  There is peripheral edema present, but one leg is not large than the other.  There is no Baker's cyst present behind either knee.  There is no synovitis, effusion of the knees or ankles.      IMPRESSION:  Ankylosing spondylitis.        RECOMMENDATIONS:    1.  Would strongly consider going on Humira.  She could check with her obstetrician as to whether or not it is safe to use with breastfeeding, but I am not aware of any contraindication.     2.  Increase the prednisone to 60 mg through the weekend and then let me know how she is doing.  If only somewhat better, then taper by 10 mg; if a lot better, go to 40.    3.  At this point she is well beyond the period of teratogenicity and hence there would be no risk to being on Humira, even during this point of late term pregnancy.    4.  She should see me back after delivery.         BOBBY DE SOUZA MD             D: 03/21/2017 15:54   T: 03/21/2017 16:12   MT: EM#[id number]      Name:     MERLENE CISNEROS   MRN:      [id number]        Account:       [id number]   :      1985           Visit Date:   2017      Document: [id number]

## 2017-03-21 NOTE — NURSING NOTE
Chief Complaint   Patient presents with     RECHECK     Was at the ER [place]  . Friday night, swelling right foot.  Rash on both legs.   Initial /73  Pulse 80 Estimated body mass index is 33.36 kg/(m^2) as calculated from the following:    Height as of 1/23/17: 1.829 m (6').    Weight as of 2/21/17: 111.6 kg (246 lb).      Patient prefers to be contacted via at Home.   Okay to leave detailed message: Yes  Patient uses MyChart: Yes    Angelica SAINZ LPN

## 2017-03-27 ENCOUNTER — MYC MEDICAL ADVICE (OUTPATIENT)
Dept: RHEUMATOLOGY | Facility: CLINIC | Age: 32
End: 2017-03-27

## 2017-03-27 DIAGNOSIS — M45.9 ANKYLOSING SPONDYLITIS (H): ICD-10-CM

## 2017-03-29 DIAGNOSIS — M45.9 ANKYLOSING SPONDYLITIS (H): ICD-10-CM

## 2017-03-29 RX ORDER — PREDNISONE 10 MG/1
TABLET ORAL
Qty: 90 TABLET | Refills: 3 | Status: CANCELLED | OUTPATIENT
Start: 2017-03-29

## 2017-03-29 RX ORDER — PREDNISONE 10 MG/1
TABLET ORAL
Qty: 90 TABLET | Refills: 3 | Status: SHIPPED | OUTPATIENT
Start: 2017-03-29 | End: 2018-02-15

## 2017-03-29 NOTE — TELEPHONE ENCOUNTER
predniSONE (DELTASONE) 10 MG tablet    Date Last Filled: 02/24/2017  QTY: 70    [facility]

## 2017-04-20 ENCOUNTER — HOSPITAL ENCOUNTER (OUTPATIENT)
Dept: LAB | Facility: CLINIC | Age: 32
Discharge: HOME | End: 2017-04-20
Attending: INTERNAL MEDICINE | Admitting: INTERNAL MEDICINE
Payer: COMMERCIAL

## 2017-04-20 ENCOUNTER — TELEPHONE (OUTPATIENT)
Dept: ORTHOPEDICS | Facility: CLINIC | Age: 32
End: 2017-04-20

## 2017-04-20 ENCOUNTER — OFFICE VISIT (OUTPATIENT)
Dept: RHEUMATOLOGY | Facility: CLINIC | Age: 32
End: 2017-04-20
Payer: COMMERCIAL

## 2017-04-20 VITALS
DIASTOLIC BLOOD PRESSURE: 72 MMHG | HEART RATE: 88 BPM | SYSTOLIC BLOOD PRESSURE: 113 MMHG | WEIGHT: 241 LBS | BODY MASS INDEX: 32.69 KG/M2

## 2017-04-20 DIAGNOSIS — M45.9 ANKYLOSING SPONDYLITIS (H): Primary | ICD-10-CM

## 2017-04-20 DIAGNOSIS — M45.9 ANKYLOSING SPONDYLITIS (H): ICD-10-CM

## 2017-04-20 PROCEDURE — 99213 OFFICE O/P EST LOW 20 MIN: CPT | Performed by: INTERNAL MEDICINE

## 2017-04-20 PROCEDURE — 86480 TB TEST CELL IMMUN MEASURE: CPT | Performed by: INTERNAL MEDICINE

## 2017-04-20 PROCEDURE — 86803 HEPATITIS C AB TEST: CPT | Performed by: INTERNAL MEDICINE

## 2017-04-20 PROCEDURE — 36415 COLL VENOUS BLD VENIPUNCTURE: CPT | Performed by: INTERNAL MEDICINE

## 2017-04-20 NOTE — PROGRESS NOTES
HISTORY:  Ms. Merlene Cisneros is seen back in followup for her ankylosing spondylitis.  We went over again how the diagnosis was arrived at.  She is now postpartum, she is on prednisone 10 mg daily and has pain in her lower back only the level 2 though.  She is also having pain where she had the epidural placed.  No other joint pain, swelling, or stiffness.  It should be remembered that the MRIs is what documented sacroiliitis.      PHYSICAL EXAMINATION:  Blood pressure is 113/72, pulse 88.  There is diminished range of motion of the lower lumbar spine.  There is no synovitis of the wrists, MCPs, or PIPs, nor the elbows, shoulders, knees or ankles.        IMPRESSION:  Magnetic resonance imaging proven ankylosing spondylitis.      RECOMMENDATIONS:    1.  Treatment algorithm for this is TNF inhibitor.  This is the algorithm proposed and endorsed by the American College of Rheumatology.  We will start on Humira 40 mg subcutaneously every 2 weeks.  Onset of action is 2-6 weeks with peak effect at 3 months.  Side effects of site reactions, infections, malignancy discussed.   2.  We will also need to check a baseline TB QuantiFERON and Hep C.    3.  Follow up after 2 months of therapy to assess response to therapy.         BOBBY DE SOUZA MD             D: 2017 12:45   T: 2017 13:36   MT: JOSÉ MIGUEL#[id number]      Name:     MERLENE CISNEROS   MRN:      [id number]        Account:      [id number]   :      1985           Visit Date:   2017      Document: [id number]

## 2017-04-20 NOTE — LETTER
[facility]  [address]  [address]  [place] [address]  [phone]      April 27, 2017      Merlene Ford  [address]  [place] [facility] [place] [address]    Medical Management  [employer]  Re: Humira/Letter dated 4/20/2017  ID:  [id number]    Dear Sirs,    We would like to request an appeal for Antonette Ford  for the medication Humira. The patient has Ankylosing Spondylitis with  MRIs that documenting sacroiliitis.  The current standard of care for patients with Ankylosing Spondylitis is a TNF-inhibitor.  Oral medications have never been found to be a effective treatment and your requirement goes against treatment guidelines.   Therefore, I believe it is medically appropriate to Ms. King with Humira in an attempt to preserve joint structure and function, and to limit the requirement for future joint replacement surgery.    Thank you for your reconsideration.  We will be waiting for your response.    Almas Ro MD Rheumatologist

## 2017-04-20 NOTE — TELEPHONE ENCOUNTER
[facility] Prior Authorization Team   Phone: [phone]  Fax: [phone]      PA Initiation    Medication: Humira  Insurance Company: [employer] [place] - Phone [phone]  Pharmacy Filling the Rx: [facility] - [place], [place] - [address]  Filling Pharmacy Phone: [phone]  Filling Pharmacy Fax: [phone]  Start Date: 4/20/2017

## 2017-04-20 NOTE — MR AVS SNAPSHOT
After Visit Summary   4/20/2017    Merlene Ford    MRN: [id number]           Patient Information     Date Of Birth          1985        Visit Information        Provider Department      4/20/2017 9:00 AM Almas Ro MD [facility]        Today's Diagnoses     Ankylosing spondylitis (H)    -  1       Follow-ups after your visit        Future tests that were ordered for you today     Open Future Orders        Priority Expected Expires Ordered    M Tuberculosis by Quantiferon Routine  4/20/2018 4/20/2017    Hepatitis C antibody Routine  4/20/2018 4/20/2017            Who to contact     If you have questions or need follow up information about today's clinic visit or your schedule please contact [facility] directly at [phone].  Normal or non-critical lab and imaging results will be communicated to you by MyChart, letter or phone within 4 business days after the clinic has received the results. If you do not hear from us within 7 days, please contact the clinic through MyChart or phone. If you have a critical or abnormal lab result, we will notify you by phone as soon as possible.  Submit refill requests through [employer] or call your pharmacy and they will forward the refill request to us. Please allow 3 business days for your refill to be completed.          Additional Information About Your Visit        MyChart Information     [employer] gives you secure access to your electronic health record. If you see a primary care provider, you can also send messages to your care team and make appointments. If you have questions, please call your primary care clinic.  If you do not have a primary care provider, please call [phone] and they will assist you.        Care EveryWhere ID     This is your Care EveryWhere ID. This could be used by other organizations to access your [place] medical records  [id number]        Your Vitals Were     Pulse BMI  (Body Mass Index)                88 32.69 kg/m2           Blood Pressure from Last 3 Encounters:   04/20/17 113/72   03/21/17 116/73   02/21/17 118/71    Weight from Last 3 Encounters:   04/20/17 109.3 kg (241 lb)   02/21/17 111.6 kg (246 lb)   01/31/17 110.2 kg (243 lb)                 Today's Medication Changes          These changes are accurate as of: 4/20/17 10:08 AM.  If you have any questions, ask your nurse or doctor.               Start taking these medicines.        Dose/Directions    adalimumab 40 MG/0.8ML pen kit   Commonly known as:  HUMIRA   Used for:  Ankylosing spondylitis (H)   Started by:  Almas Ro MD        Dose:  40 mg   Inject 0.8 mLs (40 mg) Subcutaneous every 14 days   Quantity:  2 each   Refills:  3            Where to get your medicines      These medications were sent to [facility] - [place] [address] [employer] [facility]  [address] [place] [address]    Hours:  Mon-Fri 8:30am-5:00pm Toll Free [phone] Phone:  [phone]     adalimumab 40 MG/0.8ML pen kit                Primary Care Provider    Physician No Ref-Primary       No address on file        Thank you!     Thank you for choosing [facility]  for your care. Our goal is always to provide you with excellent care. Hearing back from our patients is one way we can continue to improve our services. Please take a few minutes to complete the written survey that you may receive in the mail after your visit with us. Thank you!             Your Updated Medication List - Protect others around you: Learn how to safely use, store and throw away your medicines at www.disposemymeds.org.          This list is accurate as of: 4/20/17 10:08 AM.  Always use your most recent med list.                   Brand Name Dispense Instructions for use    adalimumab 40 MG/0.8ML pen kit    HUMIRA    2 each    Inject 0.8 mLs (40 mg) Subcutaneous every 14 days       COLACE PO       Take by mouth 2 times daily       predniSONE 10 MG tablet    DELTASONE    90 tablet    40 mg daily, taper by 10 mg every 1-2 weeks as able       PRENATAL VITAMIN PO

## 2017-04-21 LAB — HCV AB SERPL QL IA: NORMAL

## 2017-04-21 NOTE — TELEPHONE ENCOUNTER
PRIOR AUTHORIZATION DENIED    Medication: Humira - DENIED    Denial Date: 4/20/2017    Denial Rational: Patient has not tried/failed or have a contraindication to conventional, oral treatments. The services are not consistent with professionally recognized standards of medical practice; and its effectiveness in improving health outcomes for the condition for which it is recommended or prescribed is not known.     Appeal Information:   Phone: [phone]  Fax: [phone]  [facility]  Appeals Department  [address]  [place] [address]

## 2017-04-24 LAB
M TB TUBERC IFN-G BLD QL: NEGATIVE
M TB TUBERC IFN-G/MITOGEN IGNF BLD: 0.01 IU/ML

## 2017-05-04 ENCOUNTER — MYC MEDICAL ADVICE (OUTPATIENT)
Dept: RHEUMATOLOGY | Facility: CLINIC | Age: 32
End: 2017-05-04

## 2017-05-30 ENCOUNTER — MYC MEDICAL ADVICE (OUTPATIENT)
Dept: RHEUMATOLOGY | Facility: CLINIC | Age: 32
End: 2017-05-30

## 2017-06-20 ENCOUNTER — MYC MEDICAL ADVICE (OUTPATIENT)
Dept: RHEUMATOLOGY | Facility: CLINIC | Age: 32
End: 2017-06-20

## 2017-06-20 NOTE — TELEPHONE ENCOUNTER
Fax sent to [phone] for Short Term Disability   Copies sent to kareen SAINZ LPN  [facility]  Rheumatology-Dr. Ro  [address]   [address] [place] [address]

## 2017-07-18 ENCOUNTER — OFFICE VISIT (OUTPATIENT)
Dept: RHEUMATOLOGY | Facility: CLINIC | Age: 32
End: 2017-07-18
Payer: COMMERCIAL

## 2017-07-18 VITALS
WEIGHT: 249 LBS | DIASTOLIC BLOOD PRESSURE: 65 MMHG | SYSTOLIC BLOOD PRESSURE: 111 MMHG | HEART RATE: 82 BPM | BODY MASS INDEX: 33.77 KG/M2

## 2017-07-18 DIAGNOSIS — M45.0 ANKYLOSING SPONDYLITIS OF MULTIPLE SITES IN SPINE (H): Primary | ICD-10-CM

## 2017-07-18 PROCEDURE — 99213 OFFICE O/P EST LOW 20 MIN: CPT | Performed by: INTERNAL MEDICINE

## 2017-07-18 NOTE — NURSING NOTE
Chief Complaint   Patient presents with     RECHECK       Initial /65  Pulse 82  Wt 112.9 kg (249 lb)  BMI 33.77 kg/m2 Estimated body mass index is 33.77 kg/(m^2) as calculated from the following:    Height as of 1/23/17: 1.829 m (6').    Weight as of this encounter: 112.9 kg (249 lb).      Patient prefers to be contacted via at Home.   Okay to leave detailed message: Yes  Patient uses MyChart: Yes    Angelica SAINZ LPN

## 2017-07-18 NOTE — MR AVS SNAPSHOT
After Visit Summary   7/18/2017    Merlene Ford    MRN: [id number]           Patient Information     Date Of Birth          1985        Visit Information        Provider Department      7/18/2017 1:00 PM Almas Ro MD [facility]         Follow-ups after your visit        Who to contact     If you have questions or need follow up information about today's clinic visit or your schedule please contact [facility] directly at [phone].  Normal or non-critical lab and imaging results will be communicated to you by MyChart, letter or phone within 4 business days after the clinic has received the results. If you do not hear from us within 7 days, please contact the clinic through [employer] or phone. If you have a critical or abnormal lab result, we will notify you by phone as soon as possible.  Submit refill requests through RepRegen or call your pharmacy and they will forward the refill request to us. Please allow 3 business days for your refill to be completed.          Additional Information About Your Visit        MyChart Information     RepRegen gives you secure access to your electronic health record. If you see a primary care provider, you can also send messages to your care team and make appointments. If you have questions, please call your primary care clinic.  If you do not have a primary care provider, please call [phone] and they will assist you.        Care EveryWhere ID     This is your Care EveryWhere ID. This could be used by other organizations to access your [place] medical records  [id number]        Your Vitals Were     Pulse BMI (Body Mass Index)                82 33.77 kg/m2           Blood Pressure from Last 3 Encounters:   07/18/17 111/65   04/20/17 113/72   03/21/17 116/73    Weight from Last 3 Encounters:   07/18/17 112.9 kg (249 lb)   04/20/17 109.3 kg (241 lb)   02/21/17 111.6 kg (246 lb)              Today, you  had the following     No orders found for display       Primary Care Provider    Physician No Ref-Primary       No address on file        Equal Access to Services     DEIRDRE ARROYOKAVIN : Hadii aad cristal maranda Adams, elda robemichelle, rosa babin francyfloresita, yoselin naziain hayaajan senwinnie stearnsfaith alexander. So [facility] [phone].    ATENCIÓN: Si habla español, tiene a muñoz disposición servicios gratuitos de asistencia lingüística. Llame al [phone].    We comply with applicable federal civil rights laws and [place] laws. We do not discriminate on the basis of race, color, national origin, age, disability sex, sexual orientation or gender identity.            Thank you!     Thank you for choosing [facility]  for your care. Our goal is always to provide you with excellent care. Hearing back from our patients is one way we can continue to improve our services. Please take a few minutes to complete the written survey that you may receive in the mail after your visit with us. Thank you!             Your Updated Medication List - Protect others around you: Learn how to safely use, store and throw away your medicines at www.disposemymeds.org.          This list is accurate as of: 7/18/17  1:25 PM.  Always use your most recent med list.                   Brand Name Dispense Instructions for use Diagnosis    adalimumab 40 MG/0.8ML pen kit    HUMIRA    2 each    Inject 0.8 mLs (40 mg) Subcutaneous every 14 days    Ankylosing spondylitis (H)       COLACE PO      Take by mouth 2 times daily        predniSONE 10 MG tablet    DELTASONE    90 tablet    40 mg daily, taper by 10 mg every 1-2 weeks as able    Ankylosing spondylitis (H)       PRENATAL VITAMIN PO           sertraline 50 MG tablet    ZOLOFT     Take 100 mg by mouth

## 2017-07-23 NOTE — PROGRESS NOTES
History:  Ms. Ford is seen back in followup for her ankylosing spondylitis.  She has only had 2 injections of Humira and has not noticed any dramatic change in her symptoms.  She is not on prednisone.  She needs me to fill out Work Ability forms for her disability.  She tells me because of the ongoing back pain, which is her major problem, and knee pain, she cannot do any prolonged standing or kneeling or squatting.  This is mostly what is involved in her job.  She is also having some shoulder discomfort which would make it difficult for her to reach for things above her head.  There is also the problem with fatigue related to this as well.  At this point, she does not feel she needs to be back on prednisone.    PHYSICAL EXAM:  Blood pressure 111/65, pulse 82.  There is clear stiffness in her lower lumbar spine region.  There is no synovitis of the knees and ankles.    IMPRESSION:  Ankylosing spondylitis.     Recommendations:    1.  Onset of action of Humira is still 4-6 weeks away.  Hopefully she will notice some improvement at that time.    2.  At this point, she is still going to be disabled because of her job requirements, which is basically standing all day with no allowance for sitting or change of position.  She also cannot do any kneeling, squatting, or reaching for things above her head.  3.  Follow up in 4-6 weeks to make certain that there has been improvement from Humira.        Almas Ro MD    D:  07/18/2017 16:04 T:  07/21/2017 22:13  Document:  [id number] \.

## 2017-07-28 ENCOUNTER — MYC MEDICAL ADVICE (OUTPATIENT)
Dept: RHEUMATOLOGY | Facility: CLINIC | Age: 32
End: 2017-07-28

## 2017-07-28 DIAGNOSIS — M45.9 ANKYLOSING SPONDYLITIS (H): ICD-10-CM

## 2017-07-28 NOTE — TELEPHONE ENCOUNTER
Replacement rx sent.  Forwarding to provider to notify pt update on humira response.  Carmencita Blankenship RN  [facility]

## 2017-08-02 NOTE — TELEPHONE ENCOUNTER
MEDICATION APPEAL APPROVED    Medication: Humira - APPEAL APPROVED  Authorization Effective Date:  04/20/2017  Authorization Expiration Date:  04/20/2018  Approved Dose/Quantity: N/A  Reference #: [id number]   Insurance Company: [employer] [place] - Phone [phone]  Expected CoPay:       CoPay Card Available:      Foundation Assistance Needed:    Which Pharmacy is filling the prescription (Not needed for infusion/clinic administered): [facility] - [place], [place] - [facility] [address]

## 2017-08-29 ENCOUNTER — OFFICE VISIT (OUTPATIENT)
Dept: RHEUMATOLOGY | Facility: CLINIC | Age: 32
End: 2017-08-29
Payer: COMMERCIAL

## 2017-08-29 VITALS
BODY MASS INDEX: 33.09 KG/M2 | SYSTOLIC BLOOD PRESSURE: 122 MMHG | TEMPERATURE: 98.8 F | HEART RATE: 84 BPM | WEIGHT: 244 LBS | DIASTOLIC BLOOD PRESSURE: 83 MMHG

## 2017-08-29 DIAGNOSIS — M45.0 ANKYLOSING SPONDYLITIS OF MULTIPLE SITES IN SPINE (H): Primary | ICD-10-CM

## 2017-08-29 PROCEDURE — 99213 OFFICE O/P EST LOW 20 MIN: CPT | Performed by: INTERNAL MEDICINE

## 2017-08-29 NOTE — NURSING NOTE
Chief Complaint   Patient presents with     RECHECK       Initial /83  Pulse 84  Temp 98.8  F (37.1  C)  Wt 110.7 kg (244 lb)  BMI 33.09 kg/m2 Estimated body mass index is 33.09 kg/(m^2) as calculated from the following:    Height as of 1/23/17: 1.829 m (6').    Weight as of this encounter: 110.7 kg (244 lb).      Patient prefers to be contacted via at Home.   Okay to leave detailed message: Yes  Patient uses MyChart: Yes    Angelica SAINZ LPN

## 2017-08-30 ENCOUNTER — MYC MEDICAL ADVICE (OUTPATIENT)
Dept: RHEUMATOLOGY | Facility: CLINIC | Age: 32
End: 2017-08-30

## 2017-08-30 NOTE — PROGRESS NOTES
REASON FOR VISIT:  Ms. Merlene Cisneros is seen back in follow-up for ankylosing spondylitis.      SUBJECTIVE:  In the last two weeks, she has suddenly gotten dramatically better, nothing is hurting.  The only thing she notes is a little bit of stiffness when she is trying to bend over, specifically when she tries to bend over and wash her baby in the tub, or pick him back up out of the tub, but overall things are dramatically improved, and this just occurred recently.  She is ready to go back to full duty at work; however, she was not able to bring her form in because her printer was not working.      She denies having any site reactions or infections from the Humira.      PHYSICAL EXAM:   VITAL SIGNS:  Blood pressure 122/83, pulse 84.   MUSCULOSKELETAL:  Range of motion of the lumbar spine is relatively well preserved.  Joint exam is otherwise unremarkable.   SKIN:  Without lesion.      IMPRESSION:  Ankylosing spondylitis.      RECOMMENDATIONS:   1.  She will bring in the paperwork for me to fill out to return to normal work duty.   2.  Continue Humira 40 mg subcutaneously every 2 weeks.   3.  Follow up with me in approximately 3 months.         BOBBY DE SOUZA MD             D: 2017 13:10   T: 2017 05:27   MT: JOSÉ MIGUEL#[id number]      Name:     MERLENE CISNEROS   MRN:      [id number]        Account:      [id number]   :      1985           Visit Date:   2017      Document: [id number]

## 2017-08-31 ENCOUNTER — TELEPHONE (OUTPATIENT)
Dept: RHEUMATOLOGY | Facility: CLINIC | Age: 32
End: 2017-08-31

## 2017-09-11 ENCOUNTER — OFFICE VISIT (OUTPATIENT)
Dept: FAMILY MEDICINE | Facility: CLINIC | Age: 32
End: 2017-09-11
Payer: COMMERCIAL

## 2017-09-11 VITALS
TEMPERATURE: 97.8 F | SYSTOLIC BLOOD PRESSURE: 104 MMHG | BODY MASS INDEX: 32.55 KG/M2 | WEIGHT: 240 LBS | OXYGEN SATURATION: 98 % | DIASTOLIC BLOOD PRESSURE: 70 MMHG | RESPIRATION RATE: 16 BRPM | HEART RATE: 68 BPM

## 2017-09-11 DIAGNOSIS — F32.1 MODERATE MAJOR DEPRESSION (H): Primary | ICD-10-CM

## 2017-09-11 PROCEDURE — 99213 OFFICE O/P EST LOW 20 MIN: CPT | Performed by: FAMILY MEDICINE

## 2017-09-11 RX ORDER — ACETAMINOPHEN AND CODEINE PHOSPHATE 120; 12 MG/5ML; MG/5ML
1 SOLUTION ORAL DAILY
Qty: 28 TABLET | Refills: 0 | COMMUNITY
Start: 2017-09-11

## 2017-09-11 RX ORDER — ESCITALOPRAM OXALATE 10 MG/1
10 TABLET ORAL DAILY
Qty: 30 TABLET | Refills: 1 | Status: SHIPPED | OUTPATIENT
Start: 2017-09-11 | End: 2017-12-12

## 2017-09-11 ASSESSMENT — ANXIETY QUESTIONNAIRES
1. FEELING NERVOUS, ANXIOUS, OR ON EDGE: NEARLY EVERY DAY
3. WORRYING TOO MUCH ABOUT DIFFERENT THINGS: NEARLY EVERY DAY
IF YOU CHECKED OFF ANY PROBLEMS ON THIS QUESTIONNAIRE, HOW DIFFICULT HAVE THESE PROBLEMS MADE IT FOR YOU TO DO YOUR WORK, TAKE CARE OF THINGS AT HOME, OR GET ALONG WITH OTHER PEOPLE: VERY DIFFICULT
6. BECOMING EASILY ANNOYED OR IRRITABLE: NEARLY EVERY DAY
GAD7 TOTAL SCORE: 13
2. NOT BEING ABLE TO STOP OR CONTROL WORRYING: NEARLY EVERY DAY
7. FEELING AFRAID AS IF SOMETHING AWFUL MIGHT HAPPEN: SEVERAL DAYS
5. BEING SO RESTLESS THAT IT IS HARD TO SIT STILL: NOT AT ALL

## 2017-09-11 ASSESSMENT — PATIENT HEALTH QUESTIONNAIRE - PHQ9
SUM OF ALL RESPONSES TO PHQ QUESTIONS 1-9: 18
5. POOR APPETITE OR OVEREATING: NOT AT ALL

## 2017-09-11 NOTE — MR AVS SNAPSHOT
After Visit Summary   9/11/2017    Merlene Ford    MRN: [id number]           Patient Information     Date Of Birth          1985        Visit Information        Provider Department      9/11/2017 5:00 PM Lluvia Baird MD [facility]        Today's Diagnoses     Moderate major depression (H)    -  1       Follow-ups after your visit        Additional Services     MENTAL HEALTH REFERRAL       Your provider has referred you to: [facility] Providers Intake Scheduling [phone]  [email or web address]  WOULD LIKE TO GET HER SCHEDULED WITH PSYCHIATRY     All scheduling is subject to the client's specific insurance plan & benefits, provider/location availability, and provider clinical specialities.  Please arrive 15 minutes early for your first appointment and bring your completed paperwork.    Please be aware that coverage of these services is subject to the terms and limitations of your health insurance plan.  Call member services at your health plan with any benefit or coverage questions.                  Who to contact     If you have questions or need follow up information about today's clinic visit or your schedule please contact [facility] directly at [phone].  Normal or non-critical lab and imaging results will be communicated to you by EcoSwarmhart, letter or phone within 4 business days after the clinic has received the results. If you do not hear from us within 7 days, please contact the clinic through EcoSwarmhart or phone. If you have a critical or abnormal lab result, we will notify you by phone as soon as possible.  Submit refill requests through Itineris or call your pharmacy and they will forward the refill request to us. Please allow 3 business days for your refill to be completed.          Additional Information About Your Visit        MyChart Information     Itineris gives you secure access to your electronic health record. If you see a  primary care provider, you can also send messages to your care team and make appointments. If you have questions, please call your primary care clinic.  If you do not have a primary care provider, please call [phone] and they will assist you.        Care EveryWhere ID     This is your Care EveryWhere ID. This could be used by other organizations to access your [place] medical records  [id number]        Your Vitals Were     Pulse Temperature Respirations Pulse Oximetry BMI (Body Mass Index)       68 97.8  F (36.6  C) (Oral) 16 98% 32.55 kg/m2        Blood Pressure from Last 3 Encounters:   09/11/17 104/70   08/29/17 122/83   07/18/17 111/65    Weight from Last 3 Encounters:   09/11/17 240 lb (108.9 kg)   08/29/17 244 lb (110.7 kg)   07/18/17 249 lb (112.9 kg)              We Performed the Following     MENTAL HEALTH REFERRAL          Today's Medication Changes          These changes are accurate as of: 9/11/17  5:34 PM.  If you have any questions, ask your nurse or doctor.               Start taking these medicines.        Dose/Directions    escitalopram 10 MG tablet   Commonly known as:  LEXAPRO   Used for:  Moderate major depression (H)   Started by:  Lluvia Baird MD        Dose:  10 mg   Take 1 tablet (10 mg) by mouth daily   Quantity:  30 tablet   Refills:  1         Stop taking these medicines if you haven't already. Please contact your care team if you have questions.     sertraline 50 MG tablet   Commonly known as:  ZOLOFT   Stopped by:  Lluvia Baird MD                Where to get your medicines      These medications were sent to [facility]/pharmacy #[id number] [facility] [address]  [address] [address] [address]     Phone:  [phone]     escitalopram 10 MG tablet                Primary Care Provider    None       No address on file        Equal Access to Services     ARVIND CONTRERAS AH: Kim Adams, padmaja martinez, yoselin kearney  chris neely ah. So [facility] [phone].    ATENCIÓN: Si nella wynn, tiene a muñoz disposición servicios gratuitos de asistencia lingüística. Checo rhoades [phone].    We comply with applicable federal civil rights laws and [place] laws. We do not discriminate on the basis of race, color, national origin, age, disability sex, sexual orientation or gender identity.            Thank you!     Thank you for choosing [facility]  for your care. Our goal is always to provide you with excellent care. Hearing back from our patients is one way we can continue to improve our services. Please take a few minutes to complete the written survey that you may receive in the mail after your visit with us. Thank you!             Your Updated Medication List - Protect others around you: Learn how to safely use, store and throw away your medicines at www.disposemymeds.org.          This list is accurate as of: 9/11/17  5:34 PM.  Always use your most recent med list.                   Brand Name Dispense Instructions for use Diagnosis    adalimumab 40 MG/0.8ML pen kit    HUMIRA    1 each    Inject 0.8 mLs (40 mg) Subcutaneous every 14 days ONE REPLACEMENT PEN FOR DEFECT    Ankylosing spondylitis of multiple sites in spine (H)       escitalopram 10 MG tablet    LEXAPRO    30 tablet    Take 1 tablet (10 mg) by mouth daily    Moderate major depression (H)       norethindrone 0.35 MG per tablet    MICRONOR    28 tablet    Take 1 tablet (0.35 mg) by mouth daily        predniSONE 10 MG tablet    DELTASONE    90 tablet    40 mg daily, taper by 10 mg every 1-2 weeks as able    Ankylosing spondylitis (H)

## 2017-09-11 NOTE — PROGRESS NOTES
SUBJECTIVE:   Merlene Ford is a 32 year old female who presents to clinic today for the following health issues:    She got depressed toward the end of her pregnancy.  She has 5 month old baby.  Her OB started her on zoloft.   It worked at first at 50 mg.   She then had to have it increased to 100 mg and then to 150 mg.   It seems not to be working.   She is sad all the time and cries for now reason.   She has very bad dreams.   She would like to see psychiatrist.         Depression and Anxiety Follow-Up    Status since last visit: No change    Other associated symptoms:night broderick     Complicating factors:     Significant life event: Yes-  Had baby      Current substance abuse: None    No flowsheet data found.  No flowsheet data found.    PHQ-9  English  PHQ-9   Any Language  GAD7      Amount of exercise or physical activity: 4-5 days/week for an average of 45-60 minutes    Problems taking medications regularly: No    Medication side effects: none  Diet: regular (no restrictions)    History reviewed. No pertinent past medical history.    History reviewed. No pertinent surgical history.    MEDICATIONS:  Current Outpatient Prescriptions   Medication     adalimumab (HUMIRA) 40 MG/0.8ML pen kit     sertraline (ZOLOFT) 50 MG tablet     Docusate Sodium (COLACE PO)     predniSONE (DELTASONE) 10 MG tablet     Prenatal Vit-Fe Fumarate-FA (PRENATAL VITAMIN PO)     No current facility-administered medications for this visit.        SOCIAL HISTORY:  Social History   Substance Use Topics     Smoking status: Never Smoker     Smokeless tobacco: Never Used     Alcohol use Not on file       History reviewed. No pertinent family history.    Objective:  Blood pressure 104/70, pulse 68, temperature 97.8  F (36.6  C), temperature source Oral, resp. rate 16, weight 240 lb (108.9 kg), SpO2 98 %.  Neck:  There is no lymphadenopathy or thyroid tenderness or enlargement  Chest: Clear to auscultation bilaterally.  No wheezes, rales or  retractions.  CV: Regular rate and rhythm without murmurs, rubs or gallops.  Extremities: There is no clubbing, cyanosis or edema.  Peripheral pulses are present bilaterally in the radial and dorsalis pedis arteries.  Affect:  Slightly flat  Mood: good eye contact appropriate  The patient denies suidal thought    PHQ-9: 18      Assessment:  1. Moderate depression        Plan:  1. Will stop zoloft  2. Start lexapro  3. Refer to mental health  4. The potential side effects of the prescription medication were discussed with the patient.  5. Recheck in 4-6 weeks  6. Patient declined therapy

## 2017-09-11 NOTE — NURSING NOTE
Chief Complaint   Patient presents with     Referral     for mental health       Initial /70 (BP Location: Right arm, Patient Position: Chair, Cuff Size: Adult Large)  Pulse 68  Temp 97.8  F (36.6  C) (Oral)  Resp 16  Wt 240 lb (108.9 kg)  SpO2 98%  BMI 32.55 kg/m2 Estimated body mass index is 32.55 kg/(m^2) as calculated from the following:    Height as of 1/23/17: 6' (1.829 m).    Weight as of this encounter: 240 lb (108.9 kg).  Medication Reconciliation: complete

## 2017-09-12 ASSESSMENT — ANXIETY QUESTIONNAIRES: GAD7 TOTAL SCORE: 13

## 2017-10-10 ENCOUNTER — MYC MEDICAL ADVICE (OUTPATIENT)
Dept: RHEUMATOLOGY | Facility: CLINIC | Age: 32
End: 2017-10-10

## 2017-10-10 DIAGNOSIS — M45.0 ANKYLOSING SPONDYLITIS OF MULTIPLE SITES IN SPINE (H): ICD-10-CM

## 2017-12-12 ENCOUNTER — HOSPITAL ENCOUNTER (OUTPATIENT)
Dept: LAB | Facility: CLINIC | Age: 32
Discharge: HOME | End: 2017-12-12
Attending: INTERNAL MEDICINE | Admitting: INTERNAL MEDICINE
Payer: COMMERCIAL

## 2017-12-12 ENCOUNTER — OFFICE VISIT (OUTPATIENT)
Dept: RHEUMATOLOGY | Facility: CLINIC | Age: 32
End: 2017-12-12
Payer: COMMERCIAL

## 2017-12-12 VITALS
DIASTOLIC BLOOD PRESSURE: 77 MMHG | WEIGHT: 230 LBS | BODY MASS INDEX: 31.19 KG/M2 | HEART RATE: 72 BPM | SYSTOLIC BLOOD PRESSURE: 122 MMHG

## 2017-12-12 DIAGNOSIS — M45.0 ANKYLOSING SPONDYLITIS OF MULTIPLE SITES IN SPINE (H): ICD-10-CM

## 2017-12-12 LAB
AST SERPL W P-5'-P-CCNC: 14 U/L (ref 0–45)
BASOPHILS # BLD AUTO: 0 10E9/L (ref 0–0.2)
BASOPHILS NFR BLD AUTO: 0.3 %
DIFFERENTIAL METHOD BLD: NORMAL
EOSINOPHIL # BLD AUTO: 0.1 10E9/L (ref 0–0.7)
EOSINOPHIL NFR BLD AUTO: 2.1 %
ERYTHROCYTE [DISTWIDTH] IN BLOOD BY AUTOMATED COUNT: 12.5 % (ref 10–15)
HCT VFR BLD AUTO: 39.8 % (ref 35–47)
HGB BLD-MCNC: 13 G/DL (ref 11.7–15.7)
IMM GRANULOCYTES # BLD: 0 10E9/L (ref 0–0.4)
IMM GRANULOCYTES NFR BLD: 0.2 %
LYMPHOCYTES # BLD AUTO: 2 10E9/L (ref 0.8–5.3)
LYMPHOCYTES NFR BLD AUTO: 32.6 %
MCH RBC QN AUTO: 27.2 PG (ref 26.5–33)
MCHC RBC AUTO-ENTMCNC: 32.7 G/DL (ref 31.5–36.5)
MCV RBC AUTO: 83 FL (ref 78–100)
MONOCYTES # BLD AUTO: 0.6 10E9/L (ref 0–1.3)
MONOCYTES NFR BLD AUTO: 9.3 %
NEUTROPHILS # BLD AUTO: 3.4 10E9/L (ref 1.6–8.3)
NEUTROPHILS NFR BLD AUTO: 55.5 %
NRBC # BLD AUTO: 0 10*3/UL
NRBC BLD AUTO-RTO: 0 /100
PLATELET # BLD AUTO: 239 10E9/L (ref 150–450)
RBC # BLD AUTO: 4.78 10E12/L (ref 3.8–5.2)
WBC # BLD AUTO: 6.1 10E9/L (ref 4–11)

## 2017-12-12 PROCEDURE — 85025 COMPLETE CBC W/AUTO DIFF WBC: CPT | Performed by: INTERNAL MEDICINE

## 2017-12-12 PROCEDURE — 84450 TRANSFERASE (AST) (SGOT): CPT | Performed by: INTERNAL MEDICINE

## 2017-12-12 PROCEDURE — 36415 COLL VENOUS BLD VENIPUNCTURE: CPT | Performed by: INTERNAL MEDICINE

## 2017-12-12 PROCEDURE — 99213 OFFICE O/P EST LOW 20 MIN: CPT | Performed by: INTERNAL MEDICINE

## 2017-12-12 RX ORDER — SERTRALINE HYDROCHLORIDE 100 MG/1
100 TABLET, FILM COATED ORAL
COMMUNITY
Start: 2017-07-26

## 2017-12-12 RX ORDER — GABAPENTIN 300 MG/1
CAPSULE ORAL
Refills: 1 | COMMUNITY
Start: 2017-09-13 | End: 2018-02-15

## 2017-12-12 NOTE — NURSING NOTE
Chief Complaint   Patient presents with     RECHECK       Initial /77  Pulse 72  Wt 104.3 kg (230 lb)  BMI 31.19 kg/m2 Estimated body mass index is 31.19 kg/(m^2) as calculated from the following:    Height as of 1/23/17: 1.829 m (6').    Weight as of this encounter: 104.3 kg (230 lb).      Patient prefers to be contacted via at Home.   Okay to leave detailed message: Yes  Patient uses MyChart: Yes    Angelica DRUMMOND LPN

## 2017-12-12 NOTE — MR AVS SNAPSHOT
After Visit Summary   12/12/2017    Merlene Ford    MRN: [id number]           Patient Information     Date Of Birth          1985        Visit Information        Provider Department      12/12/2017 9:15 AM Almas Ro MD [facility]        Today's Diagnoses     Ankylosing spondylitis of multiple sites in spine (H)           Follow-ups after your visit        Follow-up notes from your care team     Return in about 6 months (around 6/12/2018).      Future tests that were ordered for you today     Open Future Orders        Priority Expected Expires Ordered    CBC with platelets differential Routine  12/12/2018 12/12/2017    AST Routine  12/12/2018 12/12/2017            Who to contact     If you have questions or need follow up information about today's clinic visit or your schedule please contact [facility] directly at [phone].  Normal or non-critical lab and imaging results will be communicated to you by MyChart, letter or phone within 4 business days after the clinic has received the results. If you do not hear from us within 7 days, please contact the clinic through [email or web address] or phone. If you have a critical or abnormal lab result, we will notify you by phone as soon as possible.  Submit refill requests through TicketsNow or call your pharmacy and they will forward the refill request to us. Please allow 3 business days for your refill to be completed.          Additional Information About Your Visit        MyChart Information     TicketsNow gives you secure access to your electronic health record. If you see a primary care provider, you can also send messages to your care team and make appointments. If you have questions, please call your primary care clinic.  If you do not have a primary care provider, please call [phone] and they will assist you.        Care EveryWhere ID     This is your Care EveryWhere ID. This could be used by other  organizations to access your [place] medical records  [id number]        Your Vitals Were     Pulse BMI (Body Mass Index)                72 31.19 kg/m2           Blood Pressure from Last 3 Encounters:   12/12/17 122/77   09/11/17 104/70   08/29/17 122/83    Weight from Last 3 Encounters:   12/12/17 104.3 kg (230 lb)   09/11/17 108.9 kg (240 lb)   08/29/17 110.7 kg (244 lb)                 Where to get your medicines      These medications were sent to [facility]/pharmacy #[id number] - [place], [place] - [address]  [address] [place], [facility] [address]     Phone:  [phone]     adalimumab 40 MG/0.8ML pen kit          Primary Care Provider Fax #    Physician No Ref-Primary [phone]       No address on file        Equal Access to Services     ARVIND CONTRERAS : Kim Adams, waaxflaco luqadaha, qaybestevan kaalrosi whitten, yoselin alexander. So [facility] [phone].    ATENCIÓN: Si habla español, tiene a muñoz disposición servicios gratuitos de asistencia lingüística. Checo al [phone].    We comply with applicable federal civil rights laws and [place] laws. We do not discriminate on the basis of race, color, national origin, age, disability, sex, sexual orientation, or gender identity.            Thank you!     Thank you for choosing [facility]  for your care. Our goal is always to provide you with excellent care. Hearing back from our patients is one way we can continue to improve our services. Please take a few minutes to complete the written survey that you may receive in the mail after your visit with us. Thank you!             Your Updated Medication List - Protect others around you: Learn how to safely use, store and throw away your medicines at www.disposemymeds.org.          This list is accurate as of: 12/12/17  4:11 PM.  Always use your most recent med list.                   Brand Name Dispense Instructions for use Diagnosis    adalimumab 40 MG/0.8ML  pen kit    HUMIRA    2 each    Inject 0.8 mLs (40 mg) Subcutaneous every 14 days    Ankylosing spondylitis of multiple sites in spine (H)       gabapentin 300 MG capsule    NEURONTIN     TK ONE C PO  HS        norethindrone 0.35 MG per tablet    MICRONOR    28 tablet    Take 1 tablet (0.35 mg) by mouth daily        predniSONE 10 MG tablet    DELTASONE    90 tablet    40 mg daily, taper by 10 mg every 1-2 weeks as able    Ankylosing spondylitis (H)       sertraline 100 MG tablet    ZOLOFT     Take 100 mg by mouth

## 2017-12-13 NOTE — PROGRESS NOTES
Merlene Cisneros is seen back in followup for ankylosing spondylitis.  She is doing well.  She admits to having a little stiffness still in her back but is dramatically improved on the Humira.  Injections are going eventually.  No infections at site reaction.  She is back to work full-time.  She complains occasionally of some right shoulder discomfort.  It is not really a numbness or a pain.  She has not had any injury.  However, she is carrying a 9-month-old, does note that it hurts at night when she sleeps on that side, is not severe enough that she wants to do anything special about it today.      PHYSICAL EXAMINATION:     VITAL SIGNS:  Blood pressure 122/77, pulse 72.     JOINT EXAM:  Unremarkable.     BACK:  Range of motion of the lumbar spine is actually fairly well preserved.      IMPRESSION:  Ankylosing spondylitis.      RECOMMENDATIONS:   1.  Continue Humira 4 mg subcutaneously every 2 weeks.   2.  Check labs today.   3.  Follow up with Dr. Montano in approximately 6 months.         BOBBY DE SOUZA MD             D: 2017 11:37   T: 2017 18:18   MT:       Name:     MERLENE CISNEROS   MRN:      [id number]        Account:      [id number]   :      1985           Visit Date:   2017      Document: [id number]

## 2018-01-07 ENCOUNTER — HEALTH MAINTENANCE LETTER (OUTPATIENT)
Age: 33
End: 2018-01-07

## 2018-02-13 NOTE — PROGRESS NOTES
[facility] - Rheumatology Clinic Visit     Merlene Ford MRN# [id number]   YOB: 1985    Primary care provider: No Ref-Primary, Physician  Feb 15, 2018          Assessment and Plan:   # Ankylosing spondylitis with HLA B27 negative; bilateral sacroiliitis (MRI - 10/2016)  # S/p delivery of baby April 2017  # Chronic NSAID  # Depression    Patient of Dr. Ro  Prednisone was used in the past. No h/o prescription NSAIDs in past.   Humira was started beginning of 6/2017. Breastfeeding was stopped by the patient by 10/17.  Patient noticed a night and day difference with humira in terms of SI joint, hip and leg pain and stiffness (>75% improvement in symptoms initially though it took 3 months on humira). She was able to get back to work and spend quality time with her daughter- going walks etc. She was not needing NSAIDs. Recently patient reports humira works only about 50% of her joint symptoms. Currently she takes 600mg of ibuprofen about three times daily. Her symptoms are worse when she rests.   I recommend adding  meloxicam 15mg PO daily. Side effects discussed. Cut back on ibuprofen use.   Add tramadol PRN until meloxicam starts working.   Depression - managed by psychiatry. Patient on zoloft. We discussed interaction between zoloft and NSAID. She will watch for Gi bleed risk. Patient reports that chronic pain came before depression started. No suicidal ideation currently. Patient will discuss with psychiatry regarding depression.     The labs, imaging from patient records are reviewed.     I will be back in touch with the patient through mychart/letter when results are available.       There is no immunization history on file for this patient.    Orders Placed This Encounter   Procedures     Creatinine     Erythrocyte sedimentation rate auto     CRP inflammation     Cyclic Citrullinated Peptide Antibody IgG     Anti Nuclear Edna IgG by IFA with Reflex       Return in about 6 weeks (around  3/29/2018).    Medications Discontinued During This Encounter   Medication Reason     gabapentin (NEURONTIN) 300 MG capsule      predniSONE (DELTASONE) 10 MG tablet      Current Outpatient Prescriptions   Medication Sig Dispense Refill     ibuprofen 200 MG capsule Take 200 mg by mouth every 4 hours as needed for fever 120 capsule      meloxicam (MOBIC) 15 MG tablet Take 1 tablet (15 mg) by mouth daily 30 tablet 1     traMADol (ULTRAM) 50 MG tablet Take 1 tablet as needed for pain.  No driving or alcohol use while taking medication. 30 tablet 0     sertraline (ZOLOFT) 100 MG tablet Take 100 mg by mouth       adalimumab (HUMIRA) 40 MG/0.8ML pen kit Inject 0.8 mLs (40 mg) Subcutaneous every 14 days 2 each 11     norethindrone (MICRONOR) 0.35 MG per tablet Take 1 tablet (0.35 mg) by mouth daily 28 tablet 0       Dilan Montano MD  [facility]          Active Problem List:     Patient Active Problem List    Diagnosis Date Noted     Moderate major depression (H) 09/11/2017     Priority: Medium     Ankylosing spondylitis (H) 02/21/2017     Priority: Medium            History of Present Illness:     Chief Complaint   Patient presents with     Establish Care       February 13, 2018    Have you ever seen a rheumatologist Yes Who Dr. Ro When 12/12/17  Joint pain history  Onset: pt states that she has a lot more pain in her lower back, buttocks and down right leg to above the knee. Also having pain in shoulders   Involved joints: see above  Pain scale:  4/10     Wakes the patient from sleep : No  Morning stiffness:Yes for 2 minutes  Meds used:Humira     Interim history  Since last visit:  1. Infections - Yes sore throat and cold sx for the last month  2. New symptoms/medical problem - Yes/ right great toe is very numb started 1 year ago and has got worse  3. Any side effects from Rheum medications -none  3. ER visits/Hospitalizations/surgeries - No  4. Last PCP visit: 6 months ago    Wt Readings from  Last 4 Encounters:   02/15/18 106.8 kg (235 lb 8 oz)   12/12/17 104.3 kg (230 lb)   09/11/17 108.9 kg (240 lb)   08/29/17 110.7 kg (244 lb)       Fatigue- 7/10; needs to drink a lot of coffee, pop and energy drinks    No h/o unintentional weight loss, fevers, rash, swollen glands  No family or personal history of psoriasis, ulcerative colitis or chron's disease. No h/o iritis.   Patient denies any raynauds  No h/o persistent shortness of breath, cough, chest pain  No h/o persistent nausea, vomiting, constipation, diarrhea, abdominal pain  No h/o hematochezia, hematuria, hemoptysis  No h/o seizures    BP Readings from Last 3 Encounters:   02/15/18 104/68   12/12/17 122/77   09/11/17 104/70              Review of Systems:   Complete ROS negative except for symptoms mentioned in the HPI          Past Medical History:     Ankylosing spondylitis         Social History:     Social History     Occupational History     Not on file.     Social History Main Topics     Smoking status: Never Smoker     Smokeless tobacco: Never Used     Alcohol use 0.0 oz/week     0 Standard drinks or equivalent per week      Comment: rarely     Drug use: No     Sexual activity: No            Family History:   No family history on file.         Allergies:   No Known Allergies         Medications:     Current Outpatient Prescriptions   Medication Sig Dispense Refill     ibuprofen 200 MG capsule Take 200 mg by mouth every 4 hours as needed for fever 120 capsule      meloxicam (MOBIC) 15 MG tablet Take 1 tablet (15 mg) by mouth daily 30 tablet 1     traMADol (ULTRAM) 50 MG tablet Take 1 tablet as needed for pain.  No driving or alcohol use while taking medication. 30 tablet 0     sertraline (ZOLOFT) 100 MG tablet Take 100 mg by mouth       adalimumab (HUMIRA) 40 MG/0.8ML pen kit Inject 0.8 mLs (40 mg) Subcutaneous every 14 days 2 each 11     norethindrone (MICRONOR) 0.35 MG per tablet Take 1 tablet (0.35 mg) by mouth daily 28 tablet 0             "Physical Exam:   Blood pressure 104/68, pulse 84, temperature 98.2  F (36.8  C), temperature source Oral, height 1.778 m (5' 10\"), weight 106.8 kg (235 lb 8 oz), SpO2 96 %.  Wt Readings from Last 4 Encounters:   02/15/18 106.8 kg (235 lb 8 oz)   12/12/17 104.3 kg (230 lb)   09/11/17 108.9 kg (240 lb)   08/29/17 110.7 kg (244 lb)       Constitutional: well-developed, appearing stated age; cooperative  Eyes: PERRLA, normal conjunctiva, sclera  ENT: nl external ears, nose, lips.No mucous membrane lesions, normal saliva pool  Neck: no cervical lymphadenopathy  Resp: lungs clear to auscultation,   CV: RRR, no added sounds, no edema  GI: Abdomen soft and no tenderness  : not tested  Lymph: no cervical, supraclavicular or epitrochlear nodes  MS: Tenderness in right SI joint; left trochanteric bursa. Lumbar flexion is slightly diminished. Lateral lumbar flexion and extension are normal. Cervical ROM is normal.   All shoulder, elbow, wrist, MCP/PIP/DIP, hip, knee, ankle, and foot MTP/IP joints were examined and  found normal. No active synovitis or deformity. Full ROM.  No dactylitis,  tenosynovitis, enthesopathy.  Skin: no rash in exposed areas  Psych: nl judgement, orientation, memory, affect.         Data:         Dilan Montano MD    [facility]    "

## 2018-02-15 ENCOUNTER — OFFICE VISIT (OUTPATIENT)
Dept: RHEUMATOLOGY | Facility: CLINIC | Age: 33
End: 2018-02-15
Payer: COMMERCIAL

## 2018-02-15 VITALS
HEART RATE: 84 BPM | TEMPERATURE: 98.2 F | OXYGEN SATURATION: 96 % | BODY MASS INDEX: 33.71 KG/M2 | WEIGHT: 235.5 LBS | HEIGHT: 70 IN | DIASTOLIC BLOOD PRESSURE: 68 MMHG | SYSTOLIC BLOOD PRESSURE: 104 MMHG

## 2018-02-15 DIAGNOSIS — M45.9 ANKYLOSING SPONDYLITIS, UNSPECIFIED SITE OF SPINE (H): Primary | ICD-10-CM

## 2018-02-15 LAB
CREAT SERPL-MCNC: 0.64 MG/DL (ref 0.52–1.04)
CRP SERPL-MCNC: <2.9 MG/L (ref 0–8)
ERYTHROCYTE [SEDIMENTATION RATE] IN BLOOD BY WESTERGREN METHOD: 14 MM/H (ref 0–20)
GFR SERPL CREATININE-BSD FRML MDRD: >90 ML/MIN/1.7M2

## 2018-02-15 PROCEDURE — 86200 CCP ANTIBODY: CPT | Performed by: INTERNAL MEDICINE

## 2018-02-15 PROCEDURE — 86038 ANTINUCLEAR ANTIBODIES: CPT | Performed by: INTERNAL MEDICINE

## 2018-02-15 PROCEDURE — 99214 OFFICE O/P EST MOD 30 MIN: CPT | Performed by: INTERNAL MEDICINE

## 2018-02-15 PROCEDURE — 86140 C-REACTIVE PROTEIN: CPT | Performed by: INTERNAL MEDICINE

## 2018-02-15 PROCEDURE — 85652 RBC SED RATE AUTOMATED: CPT | Performed by: INTERNAL MEDICINE

## 2018-02-15 PROCEDURE — 36415 COLL VENOUS BLD VENIPUNCTURE: CPT | Performed by: INTERNAL MEDICINE

## 2018-02-15 PROCEDURE — 82565 ASSAY OF CREATININE: CPT | Performed by: INTERNAL MEDICINE

## 2018-02-15 RX ORDER — TRAMADOL HYDROCHLORIDE 50 MG/1
TABLET ORAL
Qty: 30 TABLET | Refills: 0 | Status: SHIPPED | OUTPATIENT
Start: 2018-02-15 | End: 2018-07-08

## 2018-02-15 RX ORDER — OMEGA-3 FATTY ACIDS/FISH OIL 300-1000MG
200 CAPSULE ORAL EVERY 4 HOURS PRN
Qty: 120 CAPSULE | COMMUNITY
Start: 2018-02-15

## 2018-02-15 RX ORDER — MELOXICAM 15 MG/1
15 TABLET ORAL DAILY
Qty: 30 TABLET | Refills: 1 | Status: SHIPPED | OUTPATIENT
Start: 2018-02-15 | End: 2018-04-02

## 2018-02-15 ASSESSMENT — ROUTINE ASSESSMENT OF PATIENT INDEX DATA (RAPID3)
RAPID3 INTERPRETATION: MODERATE 6.1-12.0
TOTAL RAPID3 SCORE: 9

## 2018-02-15 NOTE — NURSING NOTE
"Chief Complaint   Patient presents with     \[facility] [place] [facility]\"" Care       Initial /68 (BP Location: Right arm, Patient Position: Chair, Cuff Size: Adult Regular)  Pulse 84  Temp 98.2  F (36.8  C) (Oral)  Ht 1.778 m (5' 10\")  Wt 106.8 kg (235 lb 8 oz)  SpO2 96%  BMI 33.79 kg/m2 Estimated body mass index is 33.79 kg/(m^2) as calculated from the following:    Height as of this encounter: 1.778 m (5' 10\").    Weight as of this encounter: 106.8 kg (235 lb 8 oz).  Medication Reconciliation: complete    Have you ever seen a rheumatologist Yes Who Dr. Ro When 12/12/17  Joint pain history  Onset: pt states that she has a lot more pain in her lower back, buttocks and down right leg to above the knee. Also having pain in shoulders   Involved joints: see above  Pain scale:  4/10     Wakes the patient from sleep : No  Morning stiffness:Yes for 2 minutes  Meds used:Humira    Interim history  Since last visit:  1. Infections - Yes sore throat and cold sx for the last month  2. New symptoms/medical problem - Yes/ right great toe is very numb started 1 year ago and has got worse  3. Any side effects from Rheum medications -none  3. ER visits/Hospitalizations/surgeries - No  4. Last PCP visit: 6 months ago  Wt Readings from Last 4 Encounters:   02/15/18 106.8 kg (235 lb 8 oz)   12/12/17 104.3 kg (230 lb)   09/11/17 108.9 kg (240 lb)   08/29/17 110.7 kg (244 lb)     BP Readings from Last 3 Encounters:   02/15/18 104/68   12/12/17 122/77   09/11/17 104/70       "

## 2018-02-15 NOTE — MR AVS SNAPSHOT
After Visit Summary   2/15/2018    Merlene Ford    MRN: [id number]           Patient Information     Date Of Birth          1985        Visit Information        Provider Department      2/15/2018 9:00 AM Dilan Montano MD [facility]        Today's Diagnoses     Ankylosing spondylitis, unspecified site of spine (H)    -  1       Follow-ups after your visit        Follow-up notes from your care team     Return in about 6 weeks (around 3/29/2018).      Your next 10 appointments already scheduled     Mar 02, 2018  3:30 PM CST   New Visit with Dilan Montano MD   [facility] ([facility]    [address] [place] [id number] [phone]              Who to contact     If you have questions or need follow up information about today's clinic visit or your schedule please contact [facility] directly at [phone].  Normal or non-critical lab and imaging results will be communicated to you by [facility], letter or phone within 4 business days after the clinic has received the results. If you do not hear from us within 7 days, please contact the clinic through [facility] or phone. If you have a critical or abnormal lab result, we will notify you by phone as soon as possible.  Submit refill requests through [facility] or call your pharmacy and they will forward the refill request to us. Please allow 3 business days for your refill to be completed.          Additional Information About Your Visit        MyChart Information     [facility] gives you secure access to your electronic health record. If you see a primary care provider, you can also send messages to your care team and make appointments. If you have questions, please call your primary care clinic.  If you do not have a primary care provider, please call [phone] and they will assist you.        Care EveryWhere ID     This is your Care EveryWhere ID. This could be  "used by other organizations to access your [facility] medical records  [id number]        Your Vitals Were     Pulse Temperature Height Pulse Oximetry BMI (Body Mass Index)       84 98.2  F (36.8  C) (Oral) 1.778 m (5' 10\") 96% 33.79 kg/m2        Blood Pressure from Last 3 Encounters:   02/15/18 104/68   12/12/17 122/77   09/11/17 104/70    Weight from Last 3 Encounters:   02/15/18 106.8 kg (235 lb 8 oz)   12/12/17 104.3 kg (230 lb)   09/11/17 108.9 kg (240 lb)              We Performed the Following     Anti Nuclear Edna IgG by IFA with Reflex     Creatinine     CRP inflammation     Cyclic Citrullinated Peptide Antibody IgG     Erythrocyte sedimentation rate auto          Today's Medication Changes          These changes are accurate as of 2/15/18  9:52 AM.  If you have any questions, ask your nurse or doctor.               Start taking these medicines.        Dose/Directions    meloxicam 15 MG tablet   Commonly known as:  MOBIC   Used for:  Ankylosing spondylitis, unspecified site of spine (H)   Started by:  Dilan Montano MD        Dose:  15 mg   Take 1 tablet (15 mg) by mouth daily   Quantity:  30 tablet   Refills:  1       traMADol 50 MG tablet   Commonly known as:  ULTRAM   Used for:  Ankylosing spondylitis, unspecified site of spine (H)   Started by:  Dilan Montano MD        Take 1 tablet as needed for pain.  No driving or alcohol use while taking medication.   Quantity:  30 tablet   Refills:  0         Stop taking these medicines if you haven't already. Please contact your care team if you have questions.     gabapentin 300 MG capsule   Commonly known as:  NEURONTIN   Stopped by:  Dilan Montano MD           predniSONE 10 MG tablet   Commonly known as:  DELTASONE   Stopped by:  Dilan Montano MD                Where to get your medicines      These medications were sent to [facility] [id number] [facility] [id number], [facility] [address]     Phone:  [phone]     meloxicam 15 MG tablet         Some of these will need a paper prescription and others can be bought over the counter.  Ask your nurse if you have questions.     Bring a paper prescription for each of these medications     traMADol 50 MG tablet                Primary Care Provider Fax #    Physician No Ref-Primary [phone]       No address on file        Equal Access to Services     ARVIND CONTRERAS : Hadii aad ku hadasho Soomaali, waaxda luqadaha, qaybta kaalmada adeegyada, waxroxanne naziain victorianon adewinnie perez chin . So [facility] [phone].    ATENCIÓN: Si habla espmax, tiene a muñoz disposición servicios gratuitos de asistencia lingüística. Llame al [phone].    We comply with applicable federal civil rights laws and [place] laws. We do not discriminate on the basis of race, color, national origin, age, disability, sex, sexual orientation, or gender identity.            Thank you!     Thank you for choosing [facility]  for your care. Our goal is always to provide you with excellent care. Hearing back from our patients is one way we can continue to improve our services. Please take a few minutes to complete the written survey that you may receive in the mail after your visit with us. Thank you!             Your Updated Medication List - Protect others around you: Learn how to safely use, store and throw away your medicines at www.disposemymeds.org.          This list is accurate as of 2/15/18  9:52 AM.  Always use your most recent med list.                   Brand Name Dispense Instructions for use Diagnosis    adalimumab 40 MG/0.8ML pen kit    HUMIRA    2 each    Inject 0.8 mLs (40 mg) Subcutaneous every 14 days    Ankylosing spondylitis of multiple sites in spine (H)       ibuprofen 200 MG capsule     120 capsule    Take 200 mg by mouth every 4 hours as needed for fever        meloxicam 15 MG tablet    MOBIC    30  tablet    Take 1 tablet (15 mg) by mouth daily    Ankylosing spondylitis, unspecified site of spine (H)       norethindrone 0.35 MG per tablet    MICRONOR    28 tablet    Take 1 tablet (0.35 mg) by mouth daily        sertraline 100 MG tablet    ZOLOFT     Take 100 mg by mouth        traMADol 50 MG tablet    ULTRAM    30 tablet    Take 1 tablet as needed for pain.  No driving or alcohol use while taking medication.    Ankylosing spondylitis, unspecified site of spine (H)

## 2018-02-16 LAB
ANA SER QL IF: NEGATIVE
CCP AB SER IA-ACNC: 1 U/ML

## 2018-02-19 NOTE — PROGRESS NOTES
Results released to [facility]:  Antibodies for rheumatoid arthritis, lupus and some related conditions are negative.   Inflammatory markers are normal.     Sincerely    Dilan Montano MD  [facility]

## 2018-02-22 ENCOUNTER — MYC MEDICAL ADVICE (OUTPATIENT)
Dept: RHEUMATOLOGY | Facility: CLINIC | Age: 33
End: 2018-02-22

## 2018-02-22 NOTE — TELEPHONE ENCOUNTER
Patient experiencing lightheadedness. She's unsure if it's related to her AS or if she should be seeing UC or PCP for this symptom.

## 2018-02-27 ENCOUNTER — OFFICE VISIT (OUTPATIENT)
Dept: URGENT CARE | Facility: URGENT CARE | Age: 33
End: 2018-02-27
Payer: COMMERCIAL

## 2018-02-27 ENCOUNTER — TELEPHONE (OUTPATIENT)
Dept: RHEUMATOLOGY | Facility: CLINIC | Age: 33
End: 2018-02-27

## 2018-02-27 VITALS
RESPIRATION RATE: 18 BRPM | SYSTOLIC BLOOD PRESSURE: 108 MMHG | HEART RATE: 60 BPM | DIASTOLIC BLOOD PRESSURE: 78 MMHG | BODY MASS INDEX: 33 KG/M2 | TEMPERATURE: 99.2 F | WEIGHT: 230 LBS

## 2018-02-27 DIAGNOSIS — M54.41 CHRONIC BILATERAL LOW BACK PAIN WITH RIGHT-SIDED SCIATICA: Primary | ICD-10-CM

## 2018-02-27 DIAGNOSIS — G89.29 CHRONIC BILATERAL LOW BACK PAIN WITH RIGHT-SIDED SCIATICA: Primary | ICD-10-CM

## 2018-02-27 PROCEDURE — 99213 OFFICE O/P EST LOW 20 MIN: CPT | Performed by: FAMILY MEDICINE

## 2018-02-27 NOTE — MR AVS SNAPSHOT
"              After Visit Summary   2/27/2018    Merlene Ford    MRN: [id number]           Patient Information     Date Of Birth          1985        Visit Information        Provider Department      2/27/2018 6:25 PM Severo Douglas MD [facility]        Today's Diagnoses     Chronic bilateral low back pain with right-sided sciatica    -  1      Care Instructions    Prednisone 40 mg daily for 5 days.    Establish with primary provider for further evaluation.        * Sciatica    Sciatica (\"Lumbar Radiculopathy\") causes a pain that spreads from the lower back down into the buttock, hip and leg. Sometimes leg pain can occur without any back pain. Sciatica is due to irritation or pressure on a spinal nerve as it comes out of the spinal canal. This is most often due to a bulge or rupture of a nearby spinal disk (the cartilage cushion between each spinal bone), which presses on a nearby nerve. Other causes include spinal stenosis (narrowing of the spinal canal) and spasm of the piriformis muscle (a muscle in the buttocks that the sciatic nerve passes through).  Sciatica may begin after a sudden twisting/bending force (such as in a car accident), or sometimes after a simple awkward movement. In either case, muscle spasm is commonly present and contributes to the pain.  The diagnosis of sciatica is made from the symptoms and physical exam. Unless you had a physical injury (such as a car accident or fall), X-rays are usually not ordered for the initial evaluation of sciatica because the nerves and disks cannot be seen on an X-ray. Most sciatica (80-90%) gets better with time.  What can I do about my low back pain?  There are three main things you can do to ease low back pain and help it go away.    Use heat or cold packs.    Take medicine as directed.    Use positions, movements and exercises. Stay active! Too much rest can make your symptoms worse.  Using heat or cold packs  Try cold packs or gentle " heat to ease your pain. Use whichever gives the most relief. Apply the cold pack or heat for 15 minutes at a time, as often as needed.  Taking medicine  If taking over-the-counter medicine:    Take ibuprofen (Advil, Motrin) 600 mg. three times a day as needed for pain.  OR    Take Aleve (naproxen sodium) 220 to 440 mg. two times a day as needed for pain  If your doctor prescribed a muscle relaxant (cyclobenzapine 10 mg.):    Take one half ( ) to 1 tablet at bedtime    Do not drive when taking this medicine. This drug may make you sleepy.  Using positions, movements and exercises  Research tells us that moving your joints and muscles can help you recover from back pain. Such activity should be simple and gentle.  Use the positions below as well as walking to help relieve your discomfort. Try taking a short walk every 3 to 4 hours during the day. Walk for a few minutes inside your home or take longer walks outside, on a treadmill or at a mall. Slowly increase the amount of time you walk. Expect discomfort when you begin, but it should lessen as your back starts to recover.  Finding a position that is comfortable  When your back pain is new, you may find that certain positions will ease your pain. Gently try each of the following positions until you find one that eases your pain. Once you find a position of comfort, use it as often as you like while you recover. Return to your daily routine as soon as possible.     Lie on your back with your legs bent. You can do this by placing a pillow under your knees or lie on the floor and rest your lower legs on the seat of a chair.    Lie on your side with your knees bent and place a pillow between your knees.    Lie on your stomach over pillows.  When should I call my doctor?  Your back pain should improve over the first couple of weeks. As it improves, you should be able to return to your normal activities. But call your doctor if:    You have a sudden change in your ability  to control? your bladder or bowels.    You begin to feel tingling in your groin or legs.    The pain spreads down your leg and into your foot.    Your toes, feet or leg muscles begin to feel weak.    You feel generally unwell or sick.    Your pain gets worse.    8270-9205 The [employer]. [address] [place] [address]. All rights reserved. This information is not intended as a substitute for professional medical care. Always follow your healthcare professional's instructions.  This information has been modified by your health care provider with permission from the publisher.        Common Spine and Disk Problems  The most common serious back problems happen when disks tear, bulge, or rupture. In such cases, an injured disk can no longer cushion the vertebrae and absorb shock. As a result, the rest of your spine may also weaken. This can lead to pain, stiffness, and other symptoms.     Torn annulus      Contained herniated disk      Extruded herniated disk     Torn annulus. A sudden movement may cause a tiny tear in an annulus. Nearby ligaments may stretch.    Contained herniated disk. As a disk wears out, the nucleus may bulge into the annulus and press on nerves.    Extruded herniated disk. When a disk ruptures, its nucleus can squeeze out and irritate a nerve.     Arthritis      Instability      Spondylolisthesis     Arthritis. As disks wear out over time, bone spurs form. These growths can irritate nerves and inflame facets.    Instability. As a disk stretches, the vertebrae slip back and forth. This can put pressure on the annulus.    Spondylolisthesis. Listhesis is a condition in which one vertebra has moved forward or backward, in relation to the one above or below it. This causes a crack (stress fracture) in the areas that link the vertebrae together. This may put pressure on the annulus, stretch the disk, and irritate nerves.  Date Last Reviewed: 10/18/2015    2909-4622 [employer]  [employer]. [address] [place] [address]. All rights reserved. This information is not intended as a substitute for professional medical care. Always follow your healthcare professional's instructions.        Causes of Lumbar (Low Back) Pain  Low back pain can be caused by problems with any part of the lumbar spine. A disk can herniate (push out) and press on a nerve. Vertebrae can rub against each other or slip out of place. This can irritate facet joints and nerves. It can also lead to stenosis, a narrowing of the spinal canal or foramen.  Pressure from a disk  Constant wear and tear on a disk can cause it to weaken and push outward. Part of the disk may then press on nearby nerves. There are two common types of herniated disks:  Contained means the soft nucleus is protruding outward.   Extruded means the firm annulus has torn, letting the soft center squeeze through.     Pressure from bone  An unstable spine   With age, a disk may thin and wear out. Vertebrae above and below the disk may begin to touch. This can put pressure on nerves. It can also cause bone spurs (growths) to form where the bones rub together.    Stenosis results when bone spurs narrow the foramen or spinal canal. This also puts pressure on nerves. Slipping vertebrae can irritate nerves and joints. They can also worsen stenosis.    In some cases, vertebrae become unstable and slip forward. This is called spondylolisthesis.     Date Last Reviewed: 10/12/2015    9684-7668 [employer]. [address] [place] [address]. All rights reserved. This information is not intended as a substitute for professional medical care. Always follow your healthcare professional's instructions.                Follow-ups after your visit        Your next 10 appointments already scheduled     Apr 02, 2018 11:30 AM CDT   Return Visit with Dilan Montano MD   [facility] ([facility])    [address] [place] [address]  [facility]  [place] [address] [phone]              Who to contact     If you have questions or need follow up information about today's clinic visit or your schedule please contact [facility] directly at [phone].  Normal or non-critical lab and imaging results will be communicated to you by MyChart, letter or phone within 4 business days after the clinic has received the results. If you do not hear from us within 7 days, please contact the clinic through [employer] or phone. If you have a critical or abnormal lab result, we will notify you by phone as soon as possible.  Submit refill requests through [employer] or call your pharmacy and they will forward the refill request to us. Please allow 3 business days for your refill to be completed.          Additional Information About Your Visit        [employer] Information     [employer] gives you secure access to your electronic health record. If you see a primary care provider, you can also send messages to your care team and make appointments. If you have questions, please call your primary care clinic.  If you do not have a primary care provider, please call [phone] and they will assist you.        Care EveryWhere ID     This is your Care EveryWhere ID. This could be used by other organizations to access your [facility] medical records  [id number]        Your Vitals Were     Pulse Temperature Respirations BMI (Body Mass Index)          60 99.2  F (37.3  C) (Tympanic) 18 33 kg/m2         Blood Pressure from Last 3 Encounters:   02/27/18 108/78   02/15/18 104/68   12/12/17 122/77    Weight from Last 3 Encounters:   02/27/18 230 lb (104.3 kg)   02/15/18 235 lb 8 oz (106.8 kg)   12/12/17 230 lb (104.3 kg)              Today, you had the following     No orders found for display       Primary Care Provider Fax #    Physician No Ref-Primary [phone]       No address on file        Equal Access to Services     ARVIND CONTRERAS : Kim bee  maranda Adams, wajordanda luqadaha, qaybta kaalmada maria dolores, yoselin brarjan catherine. So [facility] [phone].    ATENCIÓN: Si rahella kingsley, tiene a muñoz disposición servicios gratuitos de asistencia lingüística. [place] al [phone].    We comply with applicable federal civil rights laws and [place] laws. We do not discriminate on the basis of race, color, national origin, age, disability, sex, sexual orientation, or gender identity.            Thank you!     Thank you for choosing [facility]  for your care. Our goal is always to provide you with excellent care. Hearing back from our patients is one way we can continue to improve our services. Please take a few minutes to complete the written survey that you may receive in the mail after your visit with us. Thank you!             Your Updated Medication List - Protect others around you: Learn how to safely use, store and throw away your medicines at www.disposemymeds.org.          This list is accurate as of 2/27/18  7:38 PM.  Always use your most recent med list.                   Brand Name Dispense Instructions for use Diagnosis    adalimumab 40 MG/0.8ML pen kit    HUMIRA    2 each    Inject 0.8 mLs (40 mg) Subcutaneous every 14 days    Ankylosing spondylitis of multiple sites in spine (H)       AMOXICILLIN PO           CLARITIN PO           FLONASE NA           ibuprofen 200 MG capsule     120 capsule    Take 200 mg by mouth every 4 hours as needed for fever        meloxicam 15 MG tablet    MOBIC    30 tablet    Take 1 tablet (15 mg) by mouth daily    Ankylosing spondylitis, unspecified site of spine (H)       norethindrone 0.35 MG per tablet    MICRONOR    28 tablet    Take 1 tablet (0.35 mg) by mouth daily        sertraline 100 MG tablet    ZOLOFT     Take 100 mg by mouth        traMADol 50 MG tablet    ULTRAM    30 tablet    Take 1 tablet as needed for pain.  No driving or alcohol use while taking medication.    Ankylosing  spondylitis, unspecified site of spine (H)

## 2018-02-27 NOTE — TELEPHONE ENCOUNTER
Patient calling in wanting to schedule or talk to Dr. Montano. Says her pain is normally dull feeling now is more of a stabbing feeling and worsening. Please callback as soon as possible.    Thanks  Ian SAINZ  Team Coodinator

## 2018-02-27 NOTE — TELEPHONE ENCOUNTER
I spoke to patient. Patient was diagnosed with ear infection recently and was started on antibiotic.   She has new onset shooting pain to leg from her back. This is suspicious for radiculopathy/sciatica. I asked her to go to urgent care for an assessment and recommendations.

## 2018-02-28 DIAGNOSIS — M54.41 ACUTE BILATERAL LOW BACK PAIN WITH RIGHT-SIDED SCIATICA: Primary | ICD-10-CM

## 2018-02-28 DIAGNOSIS — M45.9 ANKYLOSING SPONDYLITIS, UNSPECIFIED SITE OF SPINE (H): ICD-10-CM

## 2018-02-28 NOTE — PROGRESS NOTES
SUBJECTIVE:  Chief Complaint   Patient presents with     Urgent Care     Pt c/o lower back and hip pain     Merlene Ford is a 32 year old female who presents with a chief complaint of lower back and hip pain.    Patient states that has been diagnosed with ankylosing spondylitis about 1 year ago.  Patient states that normal pain is usually dull pain, has stiffness in joints, worse in SI area.  When moves will get more achy, when rests has gotten worse.  Developed more shooting pain in right buttock to leg over the past 3 days.  Intermittent.  Patient has contacted Rheumatology and recommended to come in.  Denies any trauma or falls, denies any rash.  Patient has been on prednisone before and has this still at home.  Patient states that had been able to taper off prednisone back in April, has been on Humira and did do better.  Patient also has been on gabapentin in the past for nightmares but stopped as this was not helpful.  Currently taking mobic daily to help with pain, states that had ultram and takes this sparingly as she does not like narcotic medications due to SE.      No past medical history on file.  Current Outpatient Prescriptions   Medication Sig Dispense Refill     Loratadine (CLARITIN PO)        AMOXICILLIN PO        Fluticasone Propionate (FLONASE NA)        meloxicam (MOBIC) 15 MG tablet Take 1 tablet (15 mg) by mouth daily 30 tablet 1     traMADol (ULTRAM) 50 MG tablet Take 1 tablet as needed for pain.  No driving or alcohol use while taking medication. 30 tablet 0     sertraline (ZOLOFT) 100 MG tablet Take 100 mg by mouth       adalimumab (HUMIRA) 40 MG/0.8ML pen kit Inject 0.8 mLs (40 mg) Subcutaneous every 14 days 2 each 11     norethindrone (MICRONOR) 0.35 MG per tablet Take 1 tablet (0.35 mg) by mouth daily 28 tablet 0     ibuprofen 200 MG capsule Take 200 mg by mouth every 4 hours as needed for fever 120 capsule      Social History   Substance Use Topics     Smoking status: Never Smoker      Smokeless tobacco: Never Used     Alcohol use 0.0 oz/week     0 Standard drinks or equivalent per week      Comment: rarely       ROS:  CONSTITUTIONAL:NEGATIVE for fever, chills, change in weight  INTEGUMENTARY/SKIN: NEGATIVE for worrisome rashes, moles or lesions  ENT/MOUTH: NEGATIVE for ear, mouth and throat problems  RESP:NEGATIVE for significant cough or SOB  CV: NEGATIVE for chest pain, palpitations or peripheral edema  : negative for, dysuria and frequency  MUSCULOSKELETAL: POSITIVE  for arthralgias, back pain and radicular pain   NEURO: POSITIVE for dizziness/lightheadedness and radicular pain    EXAM:   /78  Pulse 60  Temp 99.2  F (37.3  C) (Tympanic)  Resp 18  Wt 230 lb (104.3 kg)  BMI 33 kg/m2  M/S Exam:back with decrease ROM  GENERAL APPEARANCE: healthy, alert and no distress  EXTREMITIES: peripheral pulses normal  SKIN: no suspicious lesions or rashes    X-RAY was not done.    ASSESSMENT/PLAN:  (M54.41,  G89.29) Chronic bilateral low back pain with right-sided sciatica  (primary encounter diagnosis)  Plan: prednisone burst      Reviewed that due to underlying AS that most likely will require MRI for further evaluation of her lumbar spine.  Discussed sciatica and treatment options with steroid burst.  Patient still has plenty of prednisone - okay to take 40 mg daily for 5 days to see if this will help decrease symptoms.  Reviewed indications for ER evaluation for back pain.    Recommend to establish with primary provider for coordination of care for chronic back pain with specialist.    Severo Douglas MD  February 27, 2018 7:49 PM

## 2018-02-28 NOTE — NURSING NOTE
"Chief Complaint   Patient presents with     Urgent Care     Pt c/o lower back and hip pain       Initial /78  Pulse 60  Temp 99.2  F (37.3  C) (Tympanic)  Resp 18  Wt 230 lb (104.3 kg)  BMI 33 kg/m2 Estimated body mass index is 33 kg/(m^2) as calculated from the following:    Height as of 2/15/18: 5' 10\" (1.778 m).    Weight as of this encounter: 230 lb (104.3 kg).  Medication Reconciliation: unable or not appropriate to perform    Sumanth Cee CMA  "

## 2018-02-28 NOTE — PATIENT INSTRUCTIONS
"Prednisone 40 mg daily for 5 days.    Establish with primary provider for further evaluation.        * Sciatica    Sciatica (\"Lumbar Radiculopathy\") causes a pain that spreads from the lower back down into the buttock, hip and leg. Sometimes leg pain can occur without any back pain. Sciatica is due to irritation or pressure on a spinal nerve as it comes out of the spinal canal. This is most often due to a bulge or rupture of a nearby spinal disk (the cartilage cushion between each spinal bone), which presses on a nearby nerve. Other causes include spinal stenosis (narrowing of the spinal canal) and spasm of the piriformis muscle (a muscle in the buttocks that the sciatic nerve passes through).  Sciatica may begin after a sudden twisting/bending force (such as in a car accident), or sometimes after a simple awkward movement. In either case, muscle spasm is commonly present and contributes to the pain.  The diagnosis of sciatica is made from the symptoms and physical exam. Unless you had a physical injury (such as a car accident or fall), X-rays are usually not ordered for the initial evaluation of sciatica because the nerves and disks cannot be seen on an X-ray. Most sciatica (80-90%) gets better with time.  What can I do about my low back pain?  There are three main things you can do to ease low back pain and help it go away.    Use heat or cold packs.    Take medicine as directed.    Use positions, movements and exercises. Stay active! Too much rest can make your symptoms worse.  Using heat or cold packs  Try cold packs or gentle heat to ease your pain. Use whichever gives the most relief. Apply the cold pack or heat for 15 minutes at a time, as often as needed.  Taking medicine  If taking over-the-counter medicine:    Take ibuprofen (Advil, Motrin) 600 mg. three times a day as needed for pain.  OR    Take Aleve (naproxen sodium) 220 to 440 mg. two times a day as needed for pain  If your doctor prescribed a muscle " relaxant (cyclobenzapine 10 mg.):    Take one half ( ) to 1 tablet at bedtime    Do not drive when taking this medicine. This drug may make you sleepy.  Using positions, movements and exercises  Research tells us that moving your joints and muscles can help you recover from back pain. Such activity should be simple and gentle.  Use the positions below as well as walking to help relieve your discomfort. Try taking a short walk every 3 to 4 hours during the day. Walk for a few minutes inside your home or take longer walks outside, on a treadmill or at a mall. Slowly increase the amount of time you walk. Expect discomfort when you begin, but it should lessen as your back starts to recover.  Finding a position that is comfortable  When your back pain is new, you may find that certain positions will ease your pain. Gently try each of the following positions until you find one that eases your pain. Once you find a position of comfort, use it as often as you like while you recover. Return to your daily routine as soon as possible.     Lie on your back with your legs bent. You can do this by placing a pillow under your knees or lie on the floor and rest your lower legs on the seat of a chair.    Lie on your side with your knees bent and place a pillow between your knees.    Lie on your stomach over pillows.  When should I call my doctor?  Your back pain should improve over the first couple of weeks. As it improves, you should be able to return to your normal activities. But call your doctor if:    You have a sudden change in your ability to control? your bladder or bowels.    You begin to feel tingling in your groin or legs.    The pain spreads down your leg and into your foot.    Your toes, feet or leg muscles begin to feel weak.    You feel generally unwell or sick.    Your pain gets worse.    4414-9656 [employer]. [address] [place] [address], [place] [address]. All rights reserved. This information is not  intended as a substitute for professional medical care. Always follow your healthcare professional's instructions.  This information has been modified by your health care provider with permission from the publisher.        Common Spine and Disk Problems  The most common serious back problems happen when disks tear, bulge, or rupture. In such cases, an injured disk can no longer cushion the vertebrae and absorb shock. As a result, the rest of your spine may also weaken. This can lead to pain, stiffness, and other symptoms.     Torn annulus      Contained herniated disk      Extruded herniated disk     Torn annulus. A sudden movement may cause a tiny tear in an annulus. Nearby ligaments may stretch.    Contained herniated disk. As a disk wears out, the nucleus may bulge into the annulus and press on nerves.    Extruded herniated disk. When a disk ruptures, its nucleus can squeeze out and irritate a nerve.     Arthritis      Instability      Spondylolisthesis     Arthritis. As disks wear out over time, bone spurs form. These growths can irritate nerves and inflame facets.    Instability. As a disk stretches, the vertebrae slip back and forth. This can put pressure on the annulus.    Spondylolisthesis. Listhesis is a condition in which one vertebra has moved forward or backward, in relation to the one above or below it. This causes a crack (stress fracture) in the areas that link the vertebrae together. This may put pressure on the annulus, stretch the disk, and irritate nerves.  Date Last Reviewed: 10/18/2015    0981-4327 The [employer]. [address] [place], [place] [id number]. All rights reserved. This information is not intended as a substitute for professional medical care. Always follow your healthcare professional's instructions.        Causes of Lumbar (Low Back) Pain  Low back pain can be caused by problems with any part of the lumbar spine. A disk can herniate (push out) and press on a nerve.  Vertebrae can rub against each other or slip out of place. This can irritate facet joints and nerves. It can also lead to stenosis, a narrowing of the spinal canal or foramen.  Pressure from a disk  Constant wear and tear on a disk can cause it to weaken and push outward. Part of the disk may then press on nearby nerves. There are two common types of herniated disks:  Contained means the soft nucleus is protruding outward.   Extruded means the firm annulus has torn, letting the soft center squeeze through.     Pressure from bone  An unstable spine   With age, a disk may thin and wear out. Vertebrae above and below the disk may begin to touch. This can put pressure on nerves. It can also cause bone spurs (growths) to form where the bones rub together.    Stenosis results when bone spurs narrow the foramen or spinal canal. This also puts pressure on nerves. Slipping vertebrae can irritate nerves and joints. They can also worsen stenosis.    In some cases, vertebrae become unstable and slip forward. This is called spondylolisthesis.     Date Last Reviewed: 10/12/2015    1069-6943 The [employer]. [address] [place] [address], [place] [address]. All rights reserved. This information is not intended as a substitute for professional medical care. Always follow your healthcare professional's instructions.

## 2018-03-02 ENCOUNTER — HOSPITAL ENCOUNTER (OUTPATIENT)
Dept: MRI IMAGING | Facility: CLINIC | Age: 33
Discharge: HOME | End: 2018-03-02
Attending: INTERNAL MEDICINE | Admitting: INTERNAL MEDICINE
Payer: COMMERCIAL

## 2018-03-02 DIAGNOSIS — M45.9 ANKYLOSING SPONDYLITIS, UNSPECIFIED SITE OF SPINE (H): ICD-10-CM

## 2018-03-02 DIAGNOSIS — M54.41 ACUTE BILATERAL LOW BACK PAIN WITH RIGHT-SIDED SCIATICA: ICD-10-CM

## 2018-03-02 PROCEDURE — 72148 MRI LUMBAR SPINE W/O DYE: CPT

## 2018-03-05 ENCOUNTER — MYC MEDICAL ADVICE (OUTPATIENT)
Dept: RHEUMATOLOGY | Facility: CLINIC | Age: 33
End: 2018-03-05

## 2018-03-05 DIAGNOSIS — M54.40 LOW BACK PAIN WITH SCIATICA, SCIATICA LATERALITY UNSPECIFIED, UNSPECIFIED BACK PAIN LATERALITY, UNSPECIFIED CHRONICITY: Primary | ICD-10-CM

## 2018-03-05 NOTE — PROGRESS NOTES
Results released to [facility]:  Hi Ms. Ford,  The MRI shows some minor degenerative findings. There was nothing to suggest active ankylosing spondylitis there. If your back pain continues, I can refer you to a spine specialist. Physical therapy is indicated as well. Please let me know what you think.     Sincerely    Dilan Montano MD  [facility]

## 2018-03-05 NOTE — TELEPHONE ENCOUNTER
From: Merlene Ford  To: Dilan Montano MD  Sent: 3/5/2018 1:52 PM CST  Subject: A follow-up question for a visit within the past seven days    I would like a referral for a spine specialist please. Thank you.   Merlene Ford

## 2018-03-07 ENCOUNTER — OFFICE VISIT (OUTPATIENT)
Dept: NEUROSURGERY | Facility: CLINIC | Age: 33
End: 2018-03-07
Attending: NURSE PRACTITIONER
Payer: COMMERCIAL

## 2018-03-07 VITALS
HEART RATE: 71 BPM | TEMPERATURE: 97.9 F | DIASTOLIC BLOOD PRESSURE: 77 MMHG | OXYGEN SATURATION: 98 % | SYSTOLIC BLOOD PRESSURE: 114 MMHG

## 2018-03-07 DIAGNOSIS — M54.16 LUMBAR RADICULOPATHY: Primary | ICD-10-CM

## 2018-03-07 PROCEDURE — 99203 OFFICE O/P NEW LOW 30 MIN: CPT | Performed by: NURSE PRACTITIONER

## 2018-03-07 PROCEDURE — G0463 HOSPITAL OUTPT CLINIC VISIT: HCPCS | Performed by: NURSE PRACTITIONER

## 2018-03-07 NOTE — PROGRESS NOTES
"Dr. Mason Jc  [facility]  Neurosurgery Clinic Visit        CC: Right leg pain    Primary care Provider: No Ref-Primary, Physician      Reason For Visit:   I was asked by Dr. Montano to consult on the patient for low back pain with sciatica.      HPI: Merlene Ford is a 32 year old female with RLE pain for the past 2 years.  She states the pain is a constant dull pain along the right buttock radiating into the right posterior thigh and recently it started to extend into the right calf.  She is unsure is she ever has pain in the foot, \"I'm not sure.\"   When asked about LLE pain she states, \"Not all the time but a lot I have aching in the left leg.\"  Her pain increases with walking and standing.  She has had some relief with Tylenol.  She also is most comfortable with sitting.  She denies weakness to BLE or foot drop.  She denies falls. She denies changes to bowel or bladder.  She states in the past she has had SI joint injections at \"[facility]\" with some relief.  She also states she has had PT and did not find that helpful.      Pain right now:  2  History reviewed. No pertinent past medical history.    Past Medical History reviewed with patient during visit.    History reviewed. No pertinent surgical history.  Past Surgical History reviewed with patient during visit.    Current Outpatient Prescriptions   Medication     Loratadine (CLARITIN PO)     Fluticasone Propionate (FLONASE NA)     ibuprofen 200 MG capsule     sertraline (ZOLOFT) 100 MG tablet     adalimumab (HUMIRA) 40 MG/0.8ML pen kit     norethindrone (MICRONOR) 0.35 MG per tablet     AMOXICILLIN PO     meloxicam (MOBIC) 15 MG tablet     traMADol (ULTRAM) 50 MG tablet     No current facility-administered medications for this visit.        No Known Allergies    Social History     Social History     Marital status:      Spouse name: N/A     Number of children: N/A     Years of education: N/A     Social History Main Topics "     Smoking status: Never Smoker     Smokeless tobacco: Never Used     Alcohol use 0.0 oz/week     0 Standard drinks or equivalent per week      Comment: rarely     Drug use: No     Sexual activity: No     Other Topics Concern     None     Social History Narrative       History reviewed. No pertinent family history.      ROS: 10 point ROS neg other than the symptoms noted above in the HPI.    Vital Signs: /77 (BP Location: Right arm, Cuff Size: Adult Large)  Pulse 71  Temp 97.9  F (36.6  C) (Oral)  LMP  (LMP Unknown)  SpO2 98%  Breastfeeding? No    Examination:  Constitutional:  Alert, well nourished, NAD.  HEENT: Normocephalic, atraumatic.   Pulm:  Without shortness of breath   CV:  No pitting edema of BLE.    Neurological:  Awake  Alert  Oriented x 3  Speech clear  Cranial nerves II - XII intact      Motor exam   Shoulder Abduction:  Right:  5/5   Left:  5/5  Biceps:                      Right:  5/5   Left:  5/5  Triceps:                     Right:  5/5   Left:  5/5  Wrist Extensors:       Right:  5/5   Left:  5/5  Wrist Flexors:           Right:  5/5   Left:  5/5  Intrinsics:                   Right:  5/5   Left:  5/5  Hip Flexor:                Right: 5/5  Left:  5/5  Hip Adductor:             Right:  5/5  Left:  5/5  Hip Abductor:             Right:  5/5  Left:  5/5  Gastroc Soleus:        Right:  5/5  Left:  5/5  Tib/Ant:                      Right:  5/5  Left:  5/5  EHL:                          Right:  5/5  Left:  5/5   Sensation normal to bilateral upper and lower extremities  Clonus negative  DTRs 1+ symmetric  Gait: Able to stand from a seated position. Normal non-antalgic, non-myelopathic gait.  Able to heel/toe walk without loss of balance  Cervical examination reveals good range of motion.  No tenderness to palpation of the cervical spine or paraspinous muscles bilaterally.    Lumbar examination reveals tenderness of the spine midline and right paraspinous muscles. Tenderness to palpation of  "SI joint bilaterally.  Straight leg raise is negative bilaterally.      Imaging: Lumbar MRI 3/2/18:  IMPRESSION:  1. L4-L5: Minimal right posterocentral disc protrusion. Mild central stenosis.  2. L5-S1: Very mild central disc protrusion (left asymmetric).       Assessment/Plan:    Lumbar Radiculopathy      Merlene Ford is a 32 year old female with RLE pain for the past 2 years.  She states the pain is a constant dull pain along the right buttock radiating into the right posterior thigh and recently it started to extend into the right calf.  She is unsure is she ever has pain in the foot, \"I'm not sure.\"  When asked about LLE pain she states, \"Not all the time but a lot I have aching in the left leg.\"  Her pain increases with walking and standing.  She has had some relief with Tylenol.  She also is most comfortable with sitting.  She denies weakness to BLE or foot drop.  She denies falls. She denies changes to bowel or bladder.  She states in the past she has had SI joint injections at \"[facility]\" with some relief.  She also states she has had PT and did not find that helpful.  We reviewed MRI results.  She is somewhat nonspecific when asking about pain pattern and specifically about LLE.  We discussed possible PT again and/or 5 day steroid taper, however, she specifically inquired about an injection.  We reviewed a lumbar RICH and she states that since PT hasn't helped in the past she is interested in an epidural.  This was ordered.  She will contact us if the pain does not improve and/or worsens.        Patient Instructions   Schedule lumbar epidural steroid injection (someone will contact you)  Please contact the clinic if pain persists at [phone].          Kendra Hurt [facility]  [facility]  [address]  [address] [place] [id number]    Tel [phone]  Pager [phone]    "

## 2018-03-07 NOTE — NURSING NOTE
"Merlene Ford is a 32 year old female who presents for:  Chief Complaint   Patient presents with     Neurologic Problem     referral from Dr. Khan for low back pain with sciatica        Initial Vitals:  There were no vitals taken for this visit. Estimated body mass index is 33 kg/(m^2) as calculated from the following:    Height as of 2/15/18: 5' 10\" (1.778 m).    Weight as of 2/27/18: 230 lb (104.3 kg).. There is no height or weight on file to calculate BSA. BP completed using cuff size: large  Data Unavailable    Do you feel safe in your environment?  Yes  Do you need any refills today? No    Nursing Comments: referral from Dr. Khan for low back pain with sciatica.  Patient rates her pain today as 2 bilateral leg to heel        Katty Celaya CMA, AAS      Discharge plan:   See providers dictation   Katty Celaya CMA, AAS       "

## 2018-03-07 NOTE — LETTER
"    3/7/2018         RE: Merlene Ford  [address]  [place] [address]        Dear Colleague,    Thank you for referring your patient, Merlene Ford, to the [facility]. Please see a copy of my visit note below.    Dr. Mason Jc  [facility]  Neurosurgery Clinic Visit        CC: Right leg pain    Primary care Provider: No Ref-Primary, Physician      Reason For Visit:   I was asked by Dr. Montano to consult on the patient for low back pain with sciatica.      HPI: Merlene Ford is a 32 year old female with RLE pain for the past 2 years.  She states the pain is a constant dull pain along the right buttock radiating into the right posterior thigh and recently it started to extend into the right calf.  She is unsure is she ever has pain in the foot, \"I'm not sure.\"   When asked about LLE pain she states, \"Not all the time but a lot I have aching in the left leg.\"  Her pain increases with walking and standing.  She has had some relief with Tylenol.  She also is most comfortable with sitting.  She denies weakness to BLE or foot drop.  She denies falls. She denies changes to bowel or bladder.  She states in the past she has had SI joint injections at \"[facility]\" with some relief.  She also states she has had PT and did not find that helpful.      Pain right now:  2  History reviewed. No pertinent past medical history.    Past Medical History reviewed with patient during visit.    History reviewed. No pertinent surgical history.  Past Surgical History reviewed with patient during visit.    Current Outpatient Prescriptions   Medication     Loratadine (CLARITIN PO)     Fluticasone Propionate (FLONASE NA)     ibuprofen 200 MG capsule     sertraline (ZOLOFT) 100 MG tablet     adalimumab (HUMIRA) 40 MG/0.8ML pen kit     norethindrone (MICRONOR) 0.35 MG per tablet     AMOXICILLIN PO     meloxicam (MOBIC) 15 MG tablet     traMADol (ULTRAM) 50 MG tablet     No " current facility-administered medications for this visit.        No Known Allergies    Social History     Social History     Marital status:      Spouse name: N/A     Number of children: N/A     Years of education: N/A     Social History Main Topics     Smoking status: Never Smoker     Smokeless tobacco: Never Used     Alcohol use 0.0 oz/week     0 Standard drinks or equivalent per week      Comment: rarely     Drug use: No     Sexual activity: No     Other Topics Concern     None     Social History Narrative       History reviewed. No pertinent family history.      ROS: 10 point ROS neg other than the symptoms noted above in the HPI.    Vital Signs: /77 (BP Location: Right arm, Cuff Size: Adult Large)  Pulse 71  Temp 97.9  F (36.6  C) (Oral)  LMP  (LMP Unknown)  SpO2 98%  Breastfeeding? No    Examination:  Constitutional:  Alert, well nourished, NAD.  HEENT: Normocephalic, atraumatic.   Pulm:  Without shortness of breath   CV:  No pitting edema of BLE.    Neurological:  Awake  Alert  Oriented x 3  Speech clear  Cranial nerves II - XII intact      Motor exam   Shoulder Abduction:  Right:  5/5   Left:  5/5  Biceps:                      Right:  5/5   Left:  5/5  Triceps:                     Right:  5/5   Left:  5/5  Wrist Extensors:       Right:  5/5   Left:  5/5  Wrist Flexors:           Right:  5/5   Left:  5/5  Intrinsics:                   Right:  5/5   Left:  5/5  Hip Flexor:                Right: 5/5  Left:  5/5  Hip Adductor:             Right:  5/5  Left:  5/5  Hip Abductor:             Right:  5/5  Left:  5/5  Gastroc Soleus:        Right:  5/5  Left:  5/5  Tib/Ant:                      Right:  5/5  Left:  5/5  EHL:                          Right:  5/5  Left:  5/5   Sensation normal to bilateral upper and lower extremities  Clonus negative  DTRs 1+ symmetric  Gait: Able to stand from a seated position. Normal non-antalgic, non-myelopathic gait.  Able to heel/toe walk without loss of  "balance  Cervical examination reveals good range of motion.  No tenderness to palpation of the cervical spine or paraspinous muscles bilaterally.    Lumbar examination reveals tenderness of the spine midline and right paraspinous muscles. Tenderness to palpation of SI joint bilaterally.  Straight leg raise is negative bilaterally.      Imaging: Lumbar MRI 3/2/18:  IMPRESSION:  1. L4-L5: Minimal right posterocentral disc protrusion. Mild central stenosis.  2. L5-S1: Very mild central disc protrusion (left asymmetric).       Assessment/Plan:    Lumbar Radiculopathy      Merlene Ford is a 32 year old female with RLE pain for the past 2 years.  She states the pain is a constant dull pain along the right buttock radiating into the right posterior thigh and recently it started to extend into the right calf.  She is unsure is she ever has pain in the foot, \"I'm not sure.\"  When asked about LLE pain she states, \"Not all the time but a lot I have aching in the left leg.\"  Her pain increases with walking and standing.  She has had some relief with Tylenol.  She also is most comfortable with sitting.  She denies weakness to BLE or foot drop.  She denies falls. She denies changes to bowel or bladder.  She states in the past she has had SI joint injections at \"[facility]\" with some relief.  She also states she has had PT and did not find that helpful.  We reviewed MRI results.  She is somewhat nonspecific when asking about pain pattern and specifically about LLE.  We discussed possible PT again and/or 5 day steroid taper, however, she specifically inquired about an injection.  We reviewed a lumbar RICH and she states that since PT hasn't helped in the past she is interested in an epidural.  This was ordered.  She will contact us if the pain does not improve and/or worsens.        Patient Instructions   Schedule lumbar epidural steroid injection (someone will contact you)  Please contact the clinic if pain persists at " [phone].          Kendra Hurt CNP  [facility]  [address] [address]    Tel [phone]  Pager [phone]      Again, thank you for allowing me to participate in the care of your patient.        Sincerely,        Kendra Hurt, NP

## 2018-03-07 NOTE — MR AVS SNAPSHOT
After Visit Summary   3/7/2018    Merlene Ford    MRN: [id number]           Patient Information     Date Of Birth          1985        Visit Information        Provider Department      3/7/2018 11:10 AM Kendra Hurt NP [facility]        Today's Diagnoses     Lumbar radiculopathy    -  1      Care Instructions    Schedule lumbar epidural steroid injection (someone will contact you)  Please contact the clinic if pain persists at [phone].            Follow-ups after your visit        Additional Services     PAIN MANAGEMENT REFERRAL       Your provider has referred you to: [facility]: [facility] -    Reason for Referral: Procedure Order Epidural:  Lumbar (Advanced imaging required in the last 3 years)      What is your diagnosis for the patient's pain? Lumbar radiculopathy      For any questions, contact the [facility] [place] at [phone].     **ANY DIAGNOSTIC TESTS THAT ARE NOT IN EPIC SHOULD BE SENT TO THE PAIN CENTER**    REGARDING OPIOID MEDICATIONS:  The discussion of opioids management, appropriateness of therapy, and dosing will be discussed in patients being seen for evaluation.  The pain management clinics are not long-term prescribing clinics, with transition of prescribing of medications ultimately going back to the referring provider/PCP.  If prescribing is taken over at the pain clinic, it is in actively involved patients whom are appropriate for opioids, urine drug screening is completed, and long-term prescribing plan has been determined.  Therefore, we will not be automatically taking over prescribing at the patient's first visit.  Is this agreeable to you? agrees.     Please be aware that coverage of these services is subject to the terms and limitations of your health insurance plan.  Call member services at your health plan with any benefit or coverage questions.      Please bring the following with you to  your appointment:    (1) Any X-Rays, CTs or MRIs which have been performed.  Contact the facility where they were done to arrange for  prior to your scheduled appointment.    (2) List of current medications   (3) This referral request   (4) Any documents/labs given to you for this referral                  Your next 10 appointments already scheduled     Apr 02, 2018 11:30 AM CDT   [facility] Visit with Dilan [facility]ano MD   [facility]    [address] [place] [id number] [phone]              Who to contact     If you have questions or need follow up information about today's clinic visit or your schedule please contact [facility] directly at [phone].  Normal or non-critical lab and imaging results will be communicated to you by MyChart, letter or phone within 4 business days after the clinic has received the results. If you do not hear from us within 7 days, please contact the clinic through MyChart or phone. If you have a critical or abnormal lab result, we will notify you by phone as soon as possible.  Submit refill requests through [employer] or call your pharmacy and they will forward the refill request to us. Please allow 3 business days for your refill to be completed.          Additional Information About Your Visit        [employer] Information     [employer] gives you secure access to your electronic health record. If you see a primary care provider, you can also send messages to your care team and make appointments. If you have questions, please call your primary care clinic.  If you do not have a primary care provider, please call [phone] and they will assist you.        Care EveryWhere ID     This is your Care EveryWhere ID. This could be used by other organizations to access your [facility] medical records  [id number]        Your Vitals Were     Pulse Temperature Last Period Pulse Oximetry  Breastfeeding?       71 97.9  F (36.6  C) (Oral) (LMP Unknown) 98% No        Blood Pressure from Last 3 Encounters:   03/07/18 114/77   02/27/18 108/78   02/15/18 104/68    Weight from Last 3 Encounters:   02/27/18 230 lb (104.3 kg)   02/15/18 235 lb 8 oz (106.8 kg)   12/12/17 230 lb (104.3 kg)              We Performed the Following     PAIN MANAGEMENT REFERRAL        Primary Care Provider Fax #    Physician No Ref-Primary [phone]       No address on file        Equal Access to Services     [facility]: Hadii sai Adams, wakim nagelqadaha, qaybestevan kaalmaflaco whitten, yoselin neely . So [facility] [phone].    ATENCIÓN: Si habla español, tiene a muñoz disposición servicios gratuitos de asistencia lingüística. Llame al [phone].    We comply with applicable federal civil rights laws and [place] laws. We do not discriminate on the basis of race, color, national origin, age, disability, sex, sexual orientation, or gender identity.            Thank you!     Thank you for choosing [facility]OS[facility]  for your care. Our goal is always to provide you with excellent care. Hearing back from our patients is one way we can continue to improve our services. Please take a few minutes to complete the written survey that you may receive in the mail after your visit with us. Thank you!             Your Updated Medication List - Protect others around you: Learn how to safely use, store and throw away your medicines at www.disposemymeds.org.          This list is accurate as of 3/7/18 11:44 AM.  Always use your most recent med list.                   Brand Name Dispense Instructions for use Diagnosis    adalimumab 40 MG/0.8ML pen kit    HUMIRA    2 each    Inject 0.8 mLs (40 mg) Subcutaneous every 14 days    Ankylosing spondylitis of multiple sites in spine (H)       AMOXICILLIN PO           CLARITIN PO           FLONASE NA           ibuprofen 200 MG capsule     120  capsule    Take 200 mg by mouth every 4 hours as needed for fever        meloxicam 15 MG tablet    MOBIC    30 tablet    Take 1 tablet (15 mg) by mouth daily    Ankylosing spondylitis, unspecified site of spine (H)       norethindrone 0.35 MG per tablet    MICRONOR    28 tablet    Take 1 tablet (0.35 mg) by mouth daily        sertraline 100 MG tablet    ZOLOFT     Take 100 mg by mouth        traMADol 50 MG tablet    ULTRAM    30 tablet    Take 1 tablet as needed for pain.  No driving or alcohol use while taking medication.    Ankylosing spondylitis, unspecified site of spine (H)

## 2018-03-07 NOTE — PATIENT INSTRUCTIONS
Schedule lumbar epidural steroid injection (someone will contact you)  Please contact the clinic if pain persists at [phone].

## 2018-03-08 ENCOUNTER — TELEPHONE (OUTPATIENT)
Dept: PALLIATIVE MEDICINE | Facility: CLINIC | Age: 33
End: 2018-03-08

## 2018-03-08 NOTE — TELEPHONE ENCOUNTER
KELLI for patient to schedule Lumbar RICH.      Lisa Dhillon    [place] [facility]

## 2018-03-09 NOTE — TELEPHONE ENCOUNTER
Pre-screening Questions for Radiology Injections:    Injection to be done at which interventional clinic site? [facility]    Procedure ordered by JESSICA    Procedure ordered? Lumbar Epidural Steroid Injection    What insurance would patient like us to bill for this procedure? BCBS      Worker's comp or MVA (motor vehicle accident) -Any injection DO NOT SCHEDULE and route to Shawnee Alaniz.      "[employer] insurance - For SI joint injections, DO NOT SCHEDULE and route Jeanna Christopher. Birdi FREEDOM NO PA REQUIRED EFFECTIVE 11/1/2017      [employer]- MBB's must be scheduled at LEAST two weeks apart      [employer] - Any injection besides hip/shoulder/knee joint DO NOT SCHEDULE and route to Jeanna Christopher. She will obtain PA and call pt back to schedule procedure or notify pt of denial.       HP [employer]-PA REQUIRED FOR NON-RICH OR Joint injections    Any chance of pregnancy? NO   If YES, do NOT schedule and route to RN pool    Is an  needed? No     Patient has a drive home? (mandatory) YES:     Is patient taking any blood thinners (plavix, coumadin, jantoven, warfarin, heparin, pradaxa or dabigatran )? No   If hold needed, do NOT schedule, route to RN pool     Is patient taking any aspirin products? No     If more than 325mg/day do NOT schedule; route to RN pool     For CERVICAL procedures, hold all aspirin products for 6 days.      Does the patient have a bleeding or clotting disorder? No     If YES, okay to schedule AND route to RN nurse pool    **For any patients with platelet count <100, must be forwarded to provider**    Is patient diabetic?  No  If YES, have them bring their glucometer.    Does patient have an active infection or treated for one within the past week? No     Is patient currently taking any antibiotics?  No     For patients on chronic, preventative, or prophylactic antibiotics, procedures may be scheduled.     For patients on antibiotics for active or recent  infection:    Krishna Sanches Burton, Snitzer-antibiotic course must have been completed for 4 days    Dr. Abrams-antibiotic course must have been completed for 7 days    Is patient currently taking any steroid medications? (i.e. Prednisone, Medrol)  STEROID - STARTED 3/9 FOR 5 DAYS    For patients on steroid medications:    Krishna Sanches Burton, Snitzer-steroid course must have been completed for 4 days    -steroid course must have been completed for 7 days    Reviewed with patient:  If you are started on any steroids or antibiotics between now and your appointment, you must contact us because it may affect our ability to perform your procedure.  Yes    Is patient actively being treated for cancer or immunocompromised? No  If YES, do NOT schedule and route to RN pool     Are you able to get on and off an exam table with minimal or no assistance? Yes  If NO, do NOT schedule and route to RN pool    Are you able to roll over and lay on your stomach with minimal or no assistance? Yes  If NO, do NOT schedule and route to RN pool     Any allergies to contrast dye, iodine, shellfish, or numbing and steroid medications? No  If YES, route to RN pool AND add allergy information to appointment notes    Allergies: Review of patient's allergies indicates no known allergies.      Has the patient had a flu shot or any other vaccinations within 7 days before or after the procedure.  No     Does patient have an MRI/CT?  YES:   (SI joint, hip injections, lumbar sympathetic blocks, and stellate ganglion blocks do not require an MRI)    Was the MRI done w/in the last 3 years?  Yes    Was MRI done at [facility]? Yes      If not, where was it done? N/A       If MRI was not done at [facility], [facility] or [facility] do NOT schedule and route to nursing.  If pt has an imaging disc, the injection may be scheduled but pt has to bring disc to appt. If they show up w/out disc the injection cannot be done    Reminders  (please tell patient if applicable):       Instructed pt to arrive 30 minutes early for IV start if this is for a cervical procedure, ALL sympathetic (stellate ganglion, hypogastric, or lumbar sympathetic block) and all sedation procedures (RFA, spinal cord stimulation trials).  Not Applicable   -IVs are not routinely placed for Dr. Morton cervical cases   -Dr. Condon: IVs for cervical ESIs and cervical TBDs (not CMBBs/facet inj)      If NPO for sedation, informed patient that it is okay to take medications with sips of water (except if they are to hold blood thinners).  Not Applicable   *DO take blood pressure medication if it is prescribed*      If this is for a cervical RICH, informed patient that aspirin needs to be held for 6 days.   Not Applicable      For all patients not having spinal cord stimulator (SCS) trials or radiofrequency ablations (RFAs), informed patient:    IV sedation is not provided for this procedure.  If you feel that an oral anti-anxiety medication is needed, you can discuss this further with your referring provider or primary care provider.  The Pain Clinic provider will discuss specifics of what the procedure includes at your appointment.  Most procedures last 10-20 minutes.  We use numbing medications to help with any discomfort during the procedure.  Not Applicable      Do not schedule procedures requiring IV placement in the first appointment of the day or first appointment after lunch.       For patients 85 or older we recommend having an adult stay w/ them for the remainder of the day.       Does the patient have any questions?    Jeanna Christopher  [facility]

## 2018-03-19 ENCOUNTER — RADIANT APPOINTMENT (OUTPATIENT)
Dept: GENERAL RADIOLOGY | Facility: CLINIC | Age: 33
End: 2018-03-19
Attending: PAIN MEDICINE
Payer: COMMERCIAL

## 2018-03-19 ENCOUNTER — RADIOLOGY INJECTION OFFICE VISIT (OUTPATIENT)
Dept: PALLIATIVE MEDICINE | Facility: CLINIC | Age: 33
End: 2018-03-19
Payer: COMMERCIAL

## 2018-03-19 VITALS — DIASTOLIC BLOOD PRESSURE: 76 MMHG | SYSTOLIC BLOOD PRESSURE: 113 MMHG | OXYGEN SATURATION: 99 % | HEART RATE: 67 BPM

## 2018-03-19 DIAGNOSIS — M54.16 LUMBAR RADICULOPATHY: ICD-10-CM

## 2018-03-19 DIAGNOSIS — M54.16 LUMBAR RADICULOPATHY: Primary | ICD-10-CM

## 2018-03-19 PROCEDURE — 64484 NJX AA&/STRD TFRM EPI L/S EA: CPT | Mod: RT | Performed by: PAIN MEDICINE

## 2018-03-19 PROCEDURE — 64483 NJX AA&/STRD TFRM EPI L/S 1: CPT | Mod: RT | Performed by: PAIN MEDICINE

## 2018-03-19 NOTE — MR AVS SNAPSHOT
After Visit Summary   3/19/2018    Merlene Ford    MRN: [id number]           Patient Information     Date Of Birth          1985        Visit Information        Provider Department      3/19/2018 10:45 AM Pradip Condon MD [facility]        Care Instructions    [facility] Procedure Discharge Instructions    Today you saw:   Dr. Pradip Condon    Your procedure:  Epidural steroid injection         Medications used:  Lidocaine (anesthetic)  Bupivacaine (anesthetic)   Dexamethasone (steroid) Omnipaque (contrast)              Be cautious when walking as numbness and/or weakness in the legs may occur up to 6-8 hours after the procedure due to effect of the local anesthetic    Do not drive for 6 hours. The effect of the local anesthetic could slow your reflexes.     Avoid strenuous activity for the first 24 hours. You may resume your regular activities after that.     You may shower, however avoid swimming, tub baths or hot tubs for 24 hours following your procedure    You may have a mild to moderate increase in pain for several days following the injection.      You may use ice packs for 10-15 minutes, 3 to 4 times a day at the injection site for comfort    Do not use heat to painful areas for 6 to 8 hours. This will give the local anesthetic time to wear off and prevent you from accidentally burning your skin.    You may use anti-inflammatory medications (such as Ibuprofen/Advil or Aleve) or Tylenol for pain control if necessary    With diabetes, check your blood sugar more frequently than usual as your blood sugar may be higher than normal for 10-14 days following a steroid injection. Contact your doctor who manages your diabetes if your blood sugar is higher than usual    It may take up to 14 days for the steroid medication to start working although you may feel the effect as early as a few days after the procedure.     Follow up with your referring provider  in 2-3 weeks      If you experience any of the following, call the pain center line during work hours at [phone] or on-call physician after hours at [phone]:  -Fever over 100 degree F  -Swelling, bleeding, redness, drainage, warmth at the injection site  -Progressive weakness or numbness in your legs or arms  -Loss of bowel or bladder function  -Unusual headache that is not relieved by Tylenol or your regular headache medication  -Unusual new onset of pain that is not improving    Phone #s:  Nurse triage line for general questions: [phone]            Follow-ups after your visit        Your next 10 appointments already scheduled     Apr 02, 2018 11:30 AM CDT   Return Visit with Dilan Montano MD   [facility]    [address] [address] [phone]              Who to contact     If you have questions or need follow up information about today's clinic visit or your schedule please contact [facility] directly at [phone].  Normal or non-critical lab and imaging results will be communicated to you by [employer], letter or phone within 4 business days after the clinic has received the results. If you do not hear from us within 7 days, please contact the clinic through [employer] or phone. If you have a critical or abnormal lab result, we will notify you by phone as soon as possible.  Submit refill requests through [employer] or call your pharmacy and they will forward the refill request to us. Please allow 3 business days for your refill to be completed.          Additional Information About Your Visit        [facility] Information     [employer] gives you secure access to your electronic health record. If you see a primary care provider, you can also send messages to your care team and make appointments. If you have questions, please call your primary care clinic.  If you do not have a primary care provider, please call  [phone] and they will assist you.        Care EveryWhere ID     This is your Care EveryWhere ID. This could be used by other organizations to access your [facility] medical records  [id number]        Your Vitals Were     Pulse Last Period Pulse Oximetry             79 (LMP Unknown) 98%          Blood Pressure from Last 3 Encounters:   03/19/18 103/72   03/07/18 114/77   02/27/18 108/78    Weight from Last 3 Encounters:   02/27/18 104.3 kg (230 lb)   02/15/18 106.8 kg (235 lb 8 oz)   12/12/17 104.3 kg (230 lb)              Today, you had the following     No orders found for display       Primary Care Provider Fax #    Physician No Ref-Primary [phone]       No address on file        Equal Access to Services     ARVIND CONTRERAS : Kim Adams, wakim luqadaha, qaybta kaalmada maria dolores, yoselin neely . So [facility] [phone].    ATENCIÓN: Si habla español, tiene a muñoz disposición servicios gratuitos de asistencia lingüística. Llame al [phone].    We comply with applicable federal civil rights laws and [place] laws. We do not discriminate on the basis of race, color, national origin, age, disability, sex, sexual orientation, or gender identity.            Thank you!     Thank you for choosing [facility]  for your care. Our goal is always to provide you with excellent care. Hearing back from our patients is one way we can continue to improve our services. Please take a few minutes to complete the written survey that you may receive in the mail after your visit with us. Thank you!             Your Updated Medication List - Protect others around you: Learn how to safely use, store and throw away your medicines at www.disposemymeds.org.          This list is accurate as of 3/19/18 11:01 AM.  Always use your most recent med list.                   Brand Name Dispense Instructions for use Diagnosis    adalimumab 40 MG/0.8ML pen kit    HUMIRA    2 each     Inject 0.8 mLs (40 mg) Subcutaneous every 14 days    Ankylosing spondylitis of multiple sites in spine (H)       AMOXICILLIN PO           CLARITIN PO           FLONASE NA           ibuprofen 200 MG capsule     120 capsule    Take 200 mg by mouth every 4 hours as needed for fever        meloxicam 15 MG tablet    MOBIC    30 tablet    Take 1 tablet (15 mg) by mouth daily    Ankylosing spondylitis, unspecified site of spine (H)       norethindrone 0.35 MG per tablet    MICRONOR    28 tablet    Take 1 tablet (0.35 mg) by mouth daily        sertraline 100 MG tablet    ZOLOFT     Take 100 mg by mouth        traMADol 50 MG tablet    ULTRAM    30 tablet    Take 1 tablet as needed for pain.  No driving or alcohol use while taking medication.    Ankylosing spondylitis, unspecified site of spine (H)

## 2018-03-19 NOTE — NURSING NOTE
Discharge Information    IV Discontiued Time:  NA    Amount of Fluid Infused:  NA    Discharge Criteria = When patient returns to baseline or as per MD order    Consciousness:  Pt is fully awake    Circulation:  BP +/- 20% of pre-procedure level    Respiration:  Patient is able to breathe deeply    O2 Sat:  Patient is able to maintain O2 Sat >92% on room air    Activity:  Moves 4 extremities on command    Ambulation:  Patient is able to stand and walk or stand and pivot into wheelchair    Dressing:  Clean/dry or No Dressing    Notes:   Discharge instructions and AVS given to patient    Patient meets criteria for discharge?  YES    Admitted to PCU?  No    Responsible adult present to accompany patient home?  Yes    Signature/Title:    Bell Carmona  RN Care Coordinator  [facility]

## 2018-03-19 NOTE — NURSING NOTE
Pre-procedure Intake    Have you been fasting? No     If yes, for how long?     Are you taking a prescribed blood thinner such as coumadin, Plavix, Xarelto?    No    If yes, when did you take your last dose?     Do you take aspirin?  No    If cervical procedure, have you held aspirin for 6 days?   NA    Do you have any allergies to contrast dye, iodine, steroid and/or numbing medications?  NO    Are you currently taking antibiotics or have an active infection?  NO    Have you had a fever/elevated temperature within the past week? NO    Are you currently taking oral steroids? NO    Do you have a ? Yes       Are you pregnant or breastfeeding?  NO    Are the vital signs normal?  Yes

## 2018-03-19 NOTE — PROGRESS NOTES
Pre procedure Diagnosis: lumbar radiculopathy, lumbar degenerative disc disease   Post procedure Diagnosis: Same  Procedure performed: lumbar transforaminal epidural steroid injection at Right L3-4, L4-5 , fluoroscopically guided, contrast controlled  Anesthesia: none  Complications: none  Operators: Pradip Condon MD     Indications:   Merlene Ford is a 32 year old female.  They have a history of  chronic low back pain radiating to right lower extremity.  The patient states that most of her pain is right  anterior lateral thigh.  Occasionally, the pain radiates into her foot, today she is having no symptoms below her knee.  Exam shows neg  slr and they have tried conservative treatment including meds.  Of note patient had right SI joint injection in the past with no relief.  MRI         IMPRESSION:  1. L4-L5: Minimal right posterocentral disc protrusion. Mild central  stenosis.  2. L5-S1: Very mild central disc protrusion (left asymmetric).     Options/alternatives, benefits and risks were discussed with the patient including bleeding, infection, tissue trauma, numbness, weakness, paralysis, spinal cord injury, radiation exposure, headache and reaction to medications. Questions were answered to her satisfaction and she agrees to proceed. Voluntary informed consent was obtained and signed.     Vitals were reviewed: Yes  /76  Pulse 67  LMP  (LMP Unknown)  SpO2 99%  Allergies were reviewed:  Yes   Medications were reviewed:  Yes   Pre-procedure pain score: 4/10    Procedure:  After getting informed consent, patient was brought into the procedure suite and was placed in a prone position on the procedure table.   A Pause for the Cause was performed.  Patient was prepped and draped in sterile fashion.     After identifying the right L3-4,4-5 neuroforamen, the C-arm was rotated to a right lateral oblique angle.  A total of 6ml of Lidocaine 1% was used to anesthetize the skin and the needle track at a skin  entry site coaxial with the fluoroscopy beam, and overriding the superior aspect of the neuroforamen.  A 22 gauge 3.5 inch spinal needle was advanced under intermittent fluoroscopy until it entered the foramen superiorly.    The position was then inspected from anteroposterior and lateral views, and the needle adjusted appropriately.  A total of 1ml of Omnipaque-300 was injected, confirming appropriate position, with spread into the nerve root sheath and the epidural space, with no intravascular uptake. 9ml was wasted    Then, after repeated negative aspiration, a combination of Decadron 10 mg, 0.25% bupivacaine 2 ml, diluted with 3ml of normal saline was injected.     Hemostasis was achieved, the area was cleaned, and bandaids were placed when appropriate.  The patient tolerated the procedure well, and was taken to the recovery room.    Images were saved to PACS.    Post-procedure pain score: 4/10  Follow-up includes:   -f/u phone call in one week  -f/u with referring provider    Pradip Condon MD  [facility] [place]

## 2018-03-19 NOTE — PATIENT INSTRUCTIONS
[facility] Procedure Discharge Instructions    Today you saw:   Dr. Pradip Condon    Your procedure:  Epidural steroid injection       Medications used:  Lidocaine (anesthetic)  Bupivacaine (anesthetic)   Dexamethasone (steroid) Omnipaque (contrast)              Be cautious when walking as numbness and/or weakness in the legs may occur up to 6-8 hours after the procedure due to effect of the local anesthetic    Do not drive for 6 hours. The effect of the local anesthetic could slow your reflexes.     Avoid strenuous activity for the first 24 hours. You may resume your regular activities after that.     You may shower, however avoid swimming, tub baths or hot tubs for 24 hours following your procedure    You may have a mild to moderate increase in pain for several days following the injection.      You may use ice packs for 10-15 minutes, 3 to 4 times a day at the injection site for comfort    Do not use heat to painful areas for 6 to 8 hours. This will give the local anesthetic time to wear off and prevent you from accidentally burning your skin.    You may use anti-inflammatory medications (such as Ibuprofen/Advil or Aleve) or Tylenol for pain control if necessary    With diabetes, check your blood sugar more frequently than usual as your blood sugar may be higher than normal for 10-14 days following a steroid injection. Contact your doctor who manages your diabetes if your blood sugar is higher than usual    It may take up to 14 days for the steroid medication to start working although you may feel the effect as early as a few days after the procedure.     Follow up with your referring provider in 2-3 weeks      If you experience any of the following, call the pain center line during work hours at [phone] or on-call physician after hours at [phone]:  -Fever over 100 degree F  -Swelling, bleeding, redness, drainage, warmth at the injection site  -Progressive weakness or numbness in your legs  or arms  -Loss of bowel or bladder function  -Unusual headache that is not relieved by Tylenol or your regular headache medication  -Unusual new onset of pain that is not improving    Phone #s:  Nurse triage line for general questions: [phone]

## 2018-04-02 ENCOUNTER — OFFICE VISIT (OUTPATIENT)
Dept: RHEUMATOLOGY | Facility: CLINIC | Age: 33
End: 2018-04-02
Payer: COMMERCIAL

## 2018-04-02 VITALS
DIASTOLIC BLOOD PRESSURE: 74 MMHG | WEIGHT: 239.7 LBS | OXYGEN SATURATION: 98 % | BODY MASS INDEX: 34.39 KG/M2 | TEMPERATURE: 97.8 F | HEART RATE: 86 BPM | SYSTOLIC BLOOD PRESSURE: 102 MMHG

## 2018-04-02 DIAGNOSIS — M45.9 ANKYLOSING SPONDYLITIS, UNSPECIFIED SITE OF SPINE (H): Primary | ICD-10-CM

## 2018-04-02 PROCEDURE — 99213 OFFICE O/P EST LOW 20 MIN: CPT | Performed by: INTERNAL MEDICINE

## 2018-04-02 ASSESSMENT — ROUTINE ASSESSMENT OF PATIENT INDEX DATA (RAPID3)
RAPID3 INTERPRETATION: LOW 3.1-6
TOTAL RAPID3 SCORE: 5.3

## 2018-04-02 NOTE — PROGRESS NOTES
[facility] - Rheumatology Clinic Visit     Merlene Ford MRN# [id number]   YOB: 1985    Primary care provider: No Ref-Primary, Physician  Apr 2, 2018          Assessment and Plan:   # Ankylosing spondylitis with HLA B27 negative; bilateral sacroiliitis (MRI - 10/2016)  # S/p delivery of baby April 2017  # Chronic NSAID  # Depression  # Mother has autoimmune rheumatological condition    Antibodies for rheumatoid arthritis, lupus and some related conditions are negative.   Inflammatory markers are normal.      Patient of Dr. Ro  Prednisone was used in the past. No h/o prescription NSAIDs in past.   Humira was started beginning of 6/2017. Breastfeeding was stopped by the patient by 10/17.  Patient noticed a night and day difference with humira in terms of SI joint, hip and leg pain and stiffness (>75% improvement in symptoms initially though it took 3 months on humira). She was able to get back to work and spend quality time with her daughter- going walks etc. She was not needing much NSAIDs. Recently patient reports humira works only about 50% of her joint symptoms. Currently she takes 600mg of ibuprofen about twice daily as needed. Her symptoms are worse when she rests. We discussed that her AS is under reasonable control at this time from inflammation standpoint.   I recommended adding  meloxicam 15mg PO daily.but it did not work well for her. Stopped.   Tramadol PRN occasional use only for severe pain.    Patient interested in second opinion at this point as she has remnant chronic pain which is affecting her quality of life. She will be seeing Dr. Ramires sometime soon. F/u depending on that. We discussed that ankylosing spondylitis in women may cause chronic persistent mechanical pain which may be difficult to manage.     The labs, imaging from patient records are reviewed.     I will be back in touch with the patient through mychart/letter when results are available.       There is no immunization  history on file for this patient.    No orders of the defined types were placed in this encounter.      Data Unavailable    Medications Discontinued During This Encounter   Medication Reason     Loratadine (CLARITIN PO)      AMOXICILLIN PO      Fluticasone Propionate (FLONASE NA)      meloxicam (MOBIC) 15 MG tablet      Current Outpatient Prescriptions   Medication Sig Dispense Refill     ibuprofen 200 MG capsule Take 200 mg by mouth every 4 hours as needed for fever 120 capsule      sertraline (ZOLOFT) 100 MG tablet Take 100 mg by mouth       adalimumab (HUMIRA) 40 MG/0.8ML pen kit Inject 0.8 mLs (40 mg) Subcutaneous every 14 days 2 each 11     norethindrone (MICRONOR) 0.35 MG per tablet Take 1 tablet (0.35 mg) by mouth daily 28 tablet 0     traMADol (ULTRAM) 50 MG tablet Take 1 tablet as needed for pain.  No driving or alcohol use while taking medication. (Patient not taking: Reported on 3/7/2018) 30 tablet 0       Dilan Gagandeep oMntano MD  [facility]          Active Problem List:     Patient Active Problem List    Diagnosis Date Noted     Moderate major depression (H) 09/11/2017     Priority: Medium     Ankylosing spondylitis (H) 02/21/2017     Priority: Medium            History of Present Illness:     Chief Complaint   Patient presents with     RECHECK       February 13, 2018    Have you ever seen a rheumatologist Yes Who Dr. Ro When 12/12/17  Joint pain history  Onset: pt states that she has a lot more pain in her lower back, buttocks and down right leg to above the knee. Also having pain in shoulders   Involved joints: see above  Pain scale:  4/10     Wakes the patient from sleep : No  Morning stiffness:Yes for 2 minutes  Meds used:Humira     Interim history  Since last visit:  1. Infections - Yes sore throat and cold sx for the last month  2. New symptoms/medical problem - Yes/ right great toe is very numb started 1 year ago and has got worse  3. Any side effects from Rheum medications  -none  3. ER visits/Hospitalizations/surgeries - No  4. Last PCP visit: 6 months ago    Wt Readings from Last 4 Encounters:   04/02/18 108.7 kg (239 lb 11.2 oz)   02/27/18 104.3 kg (230 lb)   02/15/18 106.8 kg (235 lb 8 oz)   12/12/17 104.3 kg (230 lb)       Fatigue- 7/10; needs to drink a lot of coffee, pop and energy drinks    No h/o unintentional weight loss, fevers, rash, swollen glands  No family or personal history of psoriasis, ulcerative colitis or chron's disease. No h/o iritis.   Patient denies any raynauds  No h/o persistent shortness of breath, cough, chest pain  No h/o persistent nausea, vomiting, constipation, diarrhea, abdominal pain  No h/o hematochezia, hematuria, hemoptysis  No h/o seizures    April 2, 2018  Have you ever seen a rheumatologist Yes Who You When 2/15/18  Joint pain history  Onset: pt states that she does well in the mornings, but as the end of the day she has so much pain that she is unable to do anything after work. Continues with right hip pain and radiates down the back of her leg. Also has a hard time lying down after working all day.  Involved joints: see above    Pain scale:  3/10     Wakes the patient from sleep : No  Morning stiffness:Yes for 10 minutes  Meds used:Humira , tramadol     Interim history  Since last visit:  1. Infections - No  2. New symptoms/medical problem - yes, has had chronic dizziness and headaches  3. Any side effects from Rheum medications -none  3. ER visits/Hospitalizations/surgeries - No  4. Last PCP visit: 6-8 weeks ago    BP Readings from Last 3 Encounters:   04/02/18 102/74   03/19/18 113/76   03/07/18 114/77              Review of Systems:   Complete ROS negative except for symptoms mentioned in the HPI          Past Medical History:     Ankylosing spondylitis         Social History:     Social History     Occupational History     Not on file.     Social History Main Topics     Smoking status: Never Smoker     Smokeless tobacco: Never Used      Alcohol use 0.0 oz/week     0 Standard drinks or equivalent per week      Comment: rarely     Drug use: No     Sexual activity: Yes            Family History:   Mother has some form of lupus         Allergies:   No Known Allergies         Medications:     Current Outpatient Prescriptions   Medication Sig Dispense Refill     ibuprofen 200 MG capsule Take 200 mg by mouth every 4 hours as needed for fever 120 capsule      sertraline (ZOLOFT) 100 MG tablet Take 100 mg by mouth       adalimumab (HUMIRA) 40 MG/0.8ML pen kit Inject 0.8 mLs (40 mg) Subcutaneous every 14 days 2 each 11     norethindrone (MICRONOR) 0.35 MG per tablet Take 1 tablet (0.35 mg) by mouth daily 28 tablet 0     traMADol (ULTRAM) 50 MG tablet Take 1 tablet as needed for pain.  No driving or alcohol use while taking medication. (Patient not taking: Reported on 3/7/2018) 30 tablet 0            Physical Exam:   Blood pressure 102/74, pulse 86, temperature 97.8  F (36.6  C), temperature source Oral, weight 108.7 kg (239 lb 11.2 oz), SpO2 98 %, not currently breastfeeding.  Wt Readings from Last 4 Encounters:   04/02/18 108.7 kg (239 lb 11.2 oz)   02/27/18 104.3 kg (230 lb)   02/15/18 106.8 kg (235 lb 8 oz)   12/12/17 104.3 kg (230 lb)       Constitutional: well-developed, appearing stated age; cooperative  MS: Tenderness in right SI joint; left trochanteric bursa. Lumbar flexion is slightly diminished. Lateral lumbar flexion and extension are normal. Cervical ROM is normal.   Psych: nl judgement, orientation, memory, affect.         Data:         Dilan Montano MD    [facility]

## 2018-04-02 NOTE — NURSING NOTE
"Chief Complaint   Patient presents with     RECHECK       Initial /74 (BP Location: Right arm, Patient Position: Chair, Cuff Size: Adult Regular)  Pulse 86  Temp 97.8  F (36.6  C) (Oral)  Wt 108.7 kg (239 lb 11.2 oz)  LMP  (LMP Unknown)  SpO2 98%  BMI 34.39 kg/m2 Estimated body mass index is 34.39 kg/(m^2) as calculated from the following:    Height as of 2/15/18: 1.778 m (5' 10\").    Weight as of this encounter: 108.7 kg (239 lb 11.2 oz).  Medication Reconciliation: complete    Have you ever seen a rheumatologist Yes Who You When 2/15/18  Joint pain history  Onset: pt states that she does well in the mornings, but as the end of the day she has so much pain that she is unable to do anything after work. Continues with right hip pain and radiates down the back of her leg. Also has a hard time lying down after working all day.  Involved joints: see above   Pain scale:  3/10     Wakes the patient from sleep : No  Morning stiffness:Yes for 10 minutes  Meds used:Humira, tramadol    Interim history  Since last visit:  1. Infections - No  2. New symptoms/medical problem - yes, has had chronic dizziness and headaches  3. Any side effects from Rheum medications -none  3. ER visits/Hospitalizations/surgeries - No  4. Last PCP visit: 6-8 weeks ago  Wt Readings from Last 4 Encounters:   04/02/18 108.7 kg (239 lb 11.2 oz)   02/27/18 104.3 kg (230 lb)   02/15/18 106.8 kg (235 lb 8 oz)   12/12/17 104.3 kg (230 lb)     BP Readings from Last 3 Encounters:   04/02/18 102/74   03/19/18 113/76   03/07/18 114/77       "

## 2018-04-02 NOTE — MR AVS SNAPSHOT
After Visit Summary   4/2/2018    Merlene Ford    MRN: [id number]           Patient Information     Date Of Birth          1985        Visit Information        Provider Department      4/2/2018 11:30 AM Dilan Montano MD [facility]         Follow-ups after your visit        Who to contact     If you have questions or need follow up information about today's clinic visit or your schedule please contact [facility] directly at [phone].  Normal or non-critical lab and imaging results will be communicated to you by MyChart, letter or phone within 4 business days after the clinic has received the results. If you do not hear from us within 7 days, please contact the clinic through "[employer]hart or phone. If you have a critical or abnormal lab result, we will notify you by phone as soon as possible.  Submit refill requests through [employer] or call your pharmacy and they will forward the refill request to us. Please allow 3 business days for your refill to be completed.          Additional Information About Your Visit        MyChart Information     [employer] gives you secure access to your electronic health record. If you see a primary care provider, you can also send messages to your care team and make appointments. If you have questions, please call your primary care clinic.  If you do not have a primary care provider, please call [phone] and they will assist you.        Care EveryWhere ID     This is your Care EveryWhere ID. This could be used by other organizations to access your [facility] medical records  [id number]        Your Vitals Were     Pulse Temperature Last Period Pulse Oximetry BMI (Body Mass Index)       86 97.8  F (36.6  C) (Oral) (LMP Unknown) 98% 34.39 kg/m2        Blood Pressure from Last 3 Encounters:   04/02/18 102/74   03/19/18 113/76   03/07/18 114/77    Weight from Last 3 Encounters:   04/02/18 108.7 kg (239 lb 11.2 oz)   02/27/18 104.3 kg  (230 lb)   02/15/18 106.8 kg (235 lb 8 oz)              Today, you had the following     No orders found for display         Today's Medication Changes          These changes are accurate as of 4/2/18  1:02 PM.  If you have any questions, ask your nurse or doctor.               Stop taking these medicines if you haven't already. Please contact your care team if you have questions.     AMOXICILLIN PO   Stopped by:  Dilan Montano MD           CLARITIN PO   Stopped by:  Dilan Montano MD           FLONASE NA   Stopped by:  Dilan Montano MD           meloxicam 15 MG tablet   Commonly known as:  MOBIC   Stopped by:  Dilan Montano MD                    Primary Care Provider Fax #    Physician No Ref-Primary [phone]       No address on file        Equal Access to Services     [facility]: Kim Adams, waaxflaco martinez, rosioybestevan kaalrosi whitten, yoselin neely . So [facility] [phone].    ATENCIÓN: Si habla español, tiene a muñoz disposición servicios gratuitos de asistencia lingüística. Llame al [phone].    We comply with applicable federal civil rights laws and [place] laws. We do not discriminate on the basis of race, color, national origin, age, disability, sex, sexual orientation, or gender identity.            Thank you!     Thank you for choosing [facility]  for your care. Our goal is always to provide you with excellent care. Hearing back from our patients is one way we can continue to improve our services. Please take a few minutes to complete the written survey that you may receive in the mail after your visit with us. Thank you!             Your Updated Medication List - Protect others around you: Learn how to safely use, store and throw away your medicines at www.disposemymeds.org.          This list is accurate as of 4/2/18  1:02 PM.  Always use your most recent med list.                   Brand  Name Dispense Instructions for use Diagnosis    adalimumab 40 MG/0.8ML pen kit    HUMIRA    2 each    Inject 0.8 mLs (40 mg) Subcutaneous every 14 days    Ankylosing spondylitis of multiple sites in spine (H)       ibuprofen 200 MG capsule     120 capsule    Take 200 mg by mouth every 4 hours as needed for fever        norethindrone 0.35 MG per tablet    MICRONOR    28 tablet    Take 1 tablet (0.35 mg) by mouth daily        sertraline 100 MG tablet    ZOLOFT     Take 100 mg by mouth        traMADol 50 MG tablet    ULTRAM    30 tablet    Take 1 tablet as needed for pain.  No driving or alcohol use while taking medication.    Ankylosing spondylitis, unspecified site of spine (H)

## 2018-04-10 ENCOUNTER — TELEPHONE (OUTPATIENT)
Dept: RHEUMATOLOGY | Facility: CLINIC | Age: 33
End: 2018-04-10

## 2018-04-10 NOTE — TELEPHONE ENCOUNTER
PA Initiation    Medication: adalimumab (HUMIRA) 40 MG/0.8ML pen kit-pa in\[facility] [place] [facility]\""[place]   Insurance Company:  Why Not Give Back ne [phone]  Pharmacy Filling the Rx: [facility] - [place], [place] - [address]  Filling Pharmacy Phone: [phone]  Filling Pharmacy Fax:    Start Date: 4/10/2018

## 2018-04-13 NOTE — TELEPHONE ENCOUNTER
Prior Authorization Approval    Authorization Effective Date: 4/12/2018  Authorization Expiration Date: 4/12/2019  Medication: adalimumab (HUMIRA) 40 MG/0.8ML pen kit-pa initaited   Reference #:     Insurance Company: [employer] - Phone [phone]  Expected CoPay:        Which Pharmacy is filling the prescription (Not needed for infusion/clinic administered): [facility] - [place] [address]  Pharmacy Notified: Yes  Patient Notified: NoComment:  proactive pa

## 2018-07-08 DIAGNOSIS — M45.9 ANKYLOSING SPONDYLITIS, UNSPECIFIED SITE OF SPINE (H): ICD-10-CM

## 2018-07-10 NOTE — TELEPHONE ENCOUNTER
Refill request for tramadol    Medication last filled 2/15/18 Quantity 30 Refills 0    Last rheumatology office visit 4/2/18  Future rheumatology office visit none scheduled    Lab Results   Component Value Date    WBC 6.1 12/12/2017    HGB 13.0 12/12/2017    MCV 83 12/12/2017    MCH 27.2 12/12/2017    MCHC 32.7 12/12/2017    RDW 12.5 12/12/2017     12/12/2017     No results found for: ALT  Lab Results   Component Value Date    AST 14 12/12/2017     Lab Results   Component Value Date    CR 0.64 02/15/2018     No results found for: URIC

## 2018-07-11 RX ORDER — TRAMADOL HYDROCHLORIDE 50 MG/1
TABLET ORAL
Qty: 30 TABLET | Refills: 0 | Status: SHIPPED | OUTPATIENT
Start: 2018-07-11

## 2018-11-26 ENCOUNTER — TELEPHONE (OUTPATIENT)
Dept: RHEUMATOLOGY | Facility: CLINIC | Age: 33
End: 2018-11-26

## 2018-11-26 NOTE — TELEPHONE ENCOUNTER
PA approved.  Effective date: 11/23/2018-11/23/2020  PA reference #: [id number]  Pt. notified:   Yes   Pt. informed directly.    [facility] Prior Authorization Team   Phone: [phone]  Fax: [phone]    This document contains references to brand-name prescription drugs that are trademarks or registered trademarks of  pharmaceutical manufacturers not affiliated with [employer].  Plan member privacy is important to us. Our employees are trained regarding the appropriate way to handle members  private  health information.  [id number] TDD/TTY: [phone]    Notice of Approval  Date: 11/23/2018  CAMPBELL CISNEROS  [address] [place] [address]  Plan Member Name: CAMPBELL CISNEROS  Plan Member ID: [id number]  Prescriber Name: BOBBY DE SOUZA  Prescriber Phone: [phone]  Prescriber Fax: [phone]  Dear CAMPBELL CISNEROS:  [facility]  received a request from your prescriber for coverage of Humira for  you.  As long as you remain covered by your prescription drug plan and there are no  changes to your plan benefits, this request is approved for the following time period:  11/23/2018 - 11/23/2020  Approvals may be subject to dosing limits in accordance with FDA approved labeling,  accepted compendia, evidence based practice guidelines or your prescription drug plan  benefits.  If you have not done so already, a prescription can be faxed to the pharmacy to be  filled. Please call Customer Care toll-free at the number on your benefit ID card or in  your benefit plan materials if you need further assistance.  Sincerely,  [facility]  PA# [id number] [phone] [id number]  This document contains references to brand-name prescription drugs that are trademarks or registered trademarks of  pharmaceutical manufacturers not affiliated with [employer].  Plan member privacy is important to us. Our employees are trained regarding the appropriate way to handle members  private  health information.  [id number]  TDD/TTY: [phone]  Questions? Do you need help in a different language or format? If you do not  speak English or have special needs, oral interpretation and alternate formats of this  notice are available, as is other assistance, by contacting us at the number on your  benefit ID card.  Swedish:  Si usted necesita asistencia o necesita hablar con alguien en Español, por favor llame  al número gratuito de Servicio al Cliente ubicado en muñoz tarjeta de identificación de  beneficios.  Chinese (simplified):  ????????????????????????????????????  ????  Tagalog:  Ambercindy youngteresita benjamincheryl ng tulong o hannahteresita mikayla [facility] Health Systemipag-usap sa isang real sa  Tagalog, mangyari lamang na formerly [facility] Medical Centerawa joe walang-bayad sa [place] sa [place]  sa marisela na nakasulat sa inyong ID kard ng benepisyo.  [place]:  Sh7ka at ohwol ei doodaii  din4k ehgo l2 bi ch8 haadeedziih [id number], t 11 sh--d7, t 11  j77k e ya [id number], ni naaltsoos bik1a gi bi ch8 hodiilniih.

## 2018-12-26 DIAGNOSIS — M45.0 ANKYLOSING SPONDYLITIS OF MULTIPLE SITES IN SPINE (H): ICD-10-CM

## 2018-12-26 NOTE — TELEPHONE ENCOUNTER
Refill request for Humira    Medication last filled 12/12/2017 Quantity 2 Refills 11    Last rheumatology office visit 4/2/2018  Future rheumatology office visit None    Lab Results   Component Value Date    WBC 6.1 12/12/2017    HGB 13.0 12/12/2017    MCV 83 12/12/2017    MCH 27.2 12/12/2017    MCHC 32.7 12/12/2017    RDW 12.5 12/12/2017     12/12/2017     No results found for: ALT  Lab Results   Component Value Date    AST 14 12/12/2017     Lab Results   Component Value Date    CR 0.64 02/15/2018     No results found for: URIC    Routing refill request to provider for review/approval because:  Drug not on the [facility] refill protocol     Patient reports she is out of this medication.    Radha KATE RN, BSN, PHN

## 2018-12-27 NOTE — TELEPHONE ENCOUNTER
Rheumatology team: Please call to notify Ms. Ford that a refill request was sent to Dr. Montano, who is out of the office.  I see that she is now following with Dr. Ramires, another rheumatologist.  Therefore, I recommend that she request a refill of Humira from Dr. Ramires.    Bernardo Beck MD  12/27/2018 12:30 PM

## 2018-12-27 NOTE — TELEPHONE ENCOUNTER
Patient was called and advise, she was in agreement.    Patt Villalba RN  Message handled by Nurse Triage.

## 2020-03-10 ENCOUNTER — HEALTH MAINTENANCE LETTER (OUTPATIENT)
Age: 35
End: 2020-03-10

## 2020-12-27 ENCOUNTER — HEALTH MAINTENANCE LETTER (OUTPATIENT)
Age: 35
End: 2020-12-27

## 2021-04-24 ENCOUNTER — HEALTH MAINTENANCE LETTER (OUTPATIENT)
Age: 36
End: 2021-04-24

## 2021-10-09 ENCOUNTER — HEALTH MAINTENANCE LETTER (OUTPATIENT)
Age: 36
End: 2021-10-09

## 2021-10-19 PROBLEM — F32.9 MAJOR DEPRESSION: Status: ACTIVE | Noted: 2017-09-11

## 2022-05-16 ENCOUNTER — HEALTH MAINTENANCE LETTER (OUTPATIENT)
Age: 37
End: 2022-05-16

## 2022-09-11 ENCOUNTER — HEALTH MAINTENANCE LETTER (OUTPATIENT)
Age: 37
End: 2022-09-11

## 2023-06-03 ENCOUNTER — HEALTH MAINTENANCE LETTER (OUTPATIENT)
Age: 38
End: 2023-06-03